# Patient Record
Sex: FEMALE | Race: WHITE | HISPANIC OR LATINO | Employment: FULL TIME | URBAN - METROPOLITAN AREA
[De-identification: names, ages, dates, MRNs, and addresses within clinical notes are randomized per-mention and may not be internally consistent; named-entity substitution may affect disease eponyms.]

---

## 2017-02-10 ENCOUNTER — TRANSCRIBE ORDERS (OUTPATIENT)
Dept: ADMINISTRATIVE | Facility: HOSPITAL | Age: 47
End: 2017-02-10

## 2017-02-10 DIAGNOSIS — Z12.31 SCREENING MAMMOGRAM FOR HIGH-RISK PATIENT: Primary | ICD-10-CM

## 2017-02-10 DIAGNOSIS — Z12.31 ENCOUNTER FOR SCREENING MAMMOGRAM FOR MALIGNANT NEOPLASM OF BREAST: ICD-10-CM

## 2017-02-20 ENCOUNTER — HOSPITAL ENCOUNTER (OUTPATIENT)
Dept: MAMMOGRAPHY | Facility: HOSPITAL | Age: 47
Discharge: HOME | End: 2017-02-20
Payer: COMMERCIAL

## 2017-02-20 ENCOUNTER — GENERIC CONVERSION - ENCOUNTER (OUTPATIENT)
Dept: OTHER | Facility: OTHER | Age: 47
End: 2017-02-20

## 2017-02-20 DIAGNOSIS — Z12.31 SCREENING MAMMOGRAM, ENCOUNTER FOR: ICD-10-CM

## 2017-02-20 PROCEDURE — G0202 SCR MAMMO BI INCL CAD: HCPCS

## 2017-03-20 ENCOUNTER — ALLSCRIPTS OFFICE VISIT (OUTPATIENT)
Dept: OTHER | Facility: OTHER | Age: 47
End: 2017-03-20

## 2017-03-20 DIAGNOSIS — E66.9 OBESITY: ICD-10-CM

## 2017-03-20 DIAGNOSIS — M79.605 PAIN OF LEFT LEG: ICD-10-CM

## 2017-03-20 DIAGNOSIS — E55.9 VITAMIN D DEFICIENCY: ICD-10-CM

## 2017-03-20 DIAGNOSIS — E78.5 HYPERLIPIDEMIA: ICD-10-CM

## 2017-03-20 DIAGNOSIS — I80.02 PHLEBITIS OF SUPERFICIAL VEIN OF LEFT LOWER EXTREMITY: ICD-10-CM

## 2017-04-06 ENCOUNTER — HOSPITAL ENCOUNTER (OUTPATIENT)
Dept: NON INVASIVE DIAGNOSTICS | Facility: CLINIC | Age: 47
Discharge: HOME | End: 2017-04-06
Payer: COMMERCIAL

## 2017-04-06 ENCOUNTER — GENERIC CONVERSION - ENCOUNTER (OUTPATIENT)
Dept: OTHER | Facility: OTHER | Age: 47
End: 2017-04-06

## 2017-04-06 DIAGNOSIS — M79.605 PAIN OF LEFT LEG: ICD-10-CM

## 2017-04-06 DIAGNOSIS — I80.02 PHLEBITIS OF SUPERFICIAL VEIN OF LEFT LOWER EXTREMITY: ICD-10-CM

## 2017-04-06 PROCEDURE — 93971 EXTREMITY STUDY: CPT

## 2017-08-09 ENCOUNTER — ALLSCRIPTS OFFICE VISIT (OUTPATIENT)
Dept: OTHER | Facility: OTHER | Age: 47
End: 2017-08-09

## 2017-08-09 DIAGNOSIS — I83.893 VARICOSE VEINS OF BILATERAL LOWER EXTREMITIES WITH OTHER COMPLICATIONS: ICD-10-CM

## 2017-09-13 ENCOUNTER — HOSPITAL ENCOUNTER (OUTPATIENT)
Dept: NON INVASIVE DIAGNOSTICS | Facility: CLINIC | Age: 47
Discharge: HOME | End: 2017-09-13
Payer: COMMERCIAL

## 2017-09-13 DIAGNOSIS — I83.893 VARICOSE VEINS OF BILATERAL LOWER EXTREMITIES WITH OTHER COMPLICATIONS: ICD-10-CM

## 2017-09-13 PROCEDURE — 93970 EXTREMITY STUDY: CPT

## 2018-01-13 VITALS
OXYGEN SATURATION: 99 % | HEART RATE: 94 BPM | BODY MASS INDEX: 38.14 KG/M2 | WEIGHT: 207.25 LBS | TEMPERATURE: 98.2 F | HEIGHT: 62 IN | SYSTOLIC BLOOD PRESSURE: 130 MMHG | RESPIRATION RATE: 14 BRPM | DIASTOLIC BLOOD PRESSURE: 84 MMHG

## 2018-01-13 VITALS — RESPIRATION RATE: 15 BRPM | HEART RATE: 86 BPM | DIASTOLIC BLOOD PRESSURE: 76 MMHG | SYSTOLIC BLOOD PRESSURE: 136 MMHG

## 2018-01-13 NOTE — RESULT NOTES
Message   mammo screening benign     Verified Results  * MAMMO SCREENING BILATERAL W CAD [id number] 09:38AM Mary Ellen Dean     Test Name Result Flag Reference   MAMMO SCREENING BILATERAL W CAD (Report)     Patient History:   Family history of breast cancer at age 48 in maternal cousin  No Hormone Replacement Therapy   Patient has never smoked  Patient's BMI is 35 1  Reason for exam: screening, asymptomatic  Mammo Screening Bilateral W CAD: February 20, 2017 - Check In #:    [de-identified]   Bilateral MLO, CC, and XCCL view(s) were taken  Technologist: COLEEN Mendez (R)(M)   Prior study comparison: November 4, 2014, bilateral digital    screening mammogram, performed at [place] [address]  September 6, 2013, digital bilateral screening    mammogram, performed at [facility]  August 17, 2011,   left breast digital diagnostic mammogram, performed at [facility]  August 11, 2011, digital bilateral screening    mammogram, performed at [facility]  March 19, 2010,    digital bilateral screening mammogram, performed at [facility]  February 20, 2009, bilateral AMA DIGITAL SCREENING    MAMMOGRAM, performed at [facility]  There are scattered fibroglandular densities  No dominant soft tissue mass, architectural distortion or    suspicious calcifications are noted  The skin and nipple    contours are within normal limits  No evidence of malignancy  No significant changes   when compared with prior studies  ACR BI-RADSï¾® Assessments: BiRad:1 - Negative     Recommendation:   Routine screening mammogram of both breasts in 1 year  A    reminder letter will be sent  Analyzed by CAD     8-10% of cancers will be missed on mammography  Management of a    palpable abnormality must be based on clinical grounds  Patients   will be notified of their results via letter from our facility     Accredited by [employer] and FDA  Transcription Location: COLEEN Quach 98: [id number]     Risk Value(s):   Tyrer-Cuzick 10 Year: 1 800%, Tyrer-Cuzick Lifetime: 8 600%,    Myriad Table: 1 5%, MAX 5 Year: 0 8%, NCI Lifetime: 8 3%   Signed by:    Lynda Greene MD   2/20/17

## 2018-01-14 NOTE — RESULT NOTES
Message   Please notify pt labs are back  Can we change her appt in April to sooner in a month or so  Let her know not to worry but I want to start her on meds and I wanna discuss it with her  tx     Verified Results  (1) CBC/PLT/DIFF [id number] 08:13AM [facility] Order Number: [id number]    TW Order Number: [id number]     Test Name Result Flag Reference   WBC COUNT 7 70 Thousand/uL  4 31-10 16   RBC COUNT 4 62 Million/uL  3 81-5 12   HEMOGLOBIN 14 5 g/dL  11 5-15 4   HEMATOCRIT 42 4 %  34 8-46  1   MCV 92 fL  82-98   MCH 31 4 pg  26 8-34 3   MCHC 34 2 g/dL  31 4-37 4   RDW 12 8 %  11 6-15 1   MPV 10 6 fL  8 9-12 7   PLATELET COUNT 782 Thousands/uL  149-390   nRBC AUTOMATED 0 /100 WBCs     NEUTROPHILS RELATIVE PERCENT 73 %  43-75   LYMPHOCYTES RELATIVE PERCENT 20 %  14-44   MONOCYTES RELATIVE PERCENT 6 %  4-12   EOSINOPHILS RELATIVE PERCENT 0 %  0-6   BASOPHILS RELATIVE PERCENT 1 %  0-1   NEUTROPHILS ABSOLUTE COUNT 5 66 Thousands/µL  1 85-7 62   LYMPHOCYTES ABSOLUTE COUNT 1 56 Thousands/µL  0 60-4 47   MONOCYTES ABSOLUTE COUNT 0 43 Thousand/µL  0 17-1 22   EOSINOPHILS ABSOLUTE COUNT 0 00 Thousand/µL  0 00-0 61   BASOPHILS ABSOLUTE COUNT 0 05 Thousands/µL  0 00-0 10     (1) TSH WITH FT4 REFLEX [id number] 08:13AM [facility] Order Number: [id number]    Patients undergoing fluorescein dye angiography may retain small amounts of fluorescein in the body for 48-72 hours post procedure  Samples containing fluorescein can produce falsely depressed TSH values  If the patient had this procedure,a specimen should be resubmitted post fluorescein clearance          The recommended reference ranges for TSH during pregnancy are as follows:  First trimester 0 1 to 2 5 uIU/mL  Second trimester  0 2 to 3 0 uIU/mL  Third trimester 0 3 to 3 0 uIU/m     Test Name Result Flag Reference   TSH 1 730 uIU/mL  0 358-3 740     (1) COMPREHENSIVE METABOLIC PANEL [id number] 92:67BJ Arturo Kappa   TW Order Number: [id number]      National Kidney Disease Education Program recommendations are as follows:  GFR calculation is accurate only with a steady state creatinine  Chronic Kidney disease less than 60 ml/min/1 73 sq  meters  Kidney failure less than 15 ml/min/1 73 sq  meters  Test Name Result Flag Reference   GLUCOSE,RANDM 87 mg/dL     If the patient is fasting, the ADA then defines impaired fasting glucose as > 100 mg/dL and diabetes as > or equal to 123 mg/dL  SODIUM 137 mmol/L  136-145   POTASSIUM 4 2 mmol/L  3 5-5 3   CHLORIDE 103 mmol/L  100-108   CARBON DIOXIDE 27 mmol/L  21-32   ANION GAP (CALC) 7 mmol/L  4-13   BLOOD UREA NITROGEN 15 mg/dL  5-25   CREATININE 0 82 mg/dL  0 60-1 30   Standardized to IDMS reference method   CALCIUM 9 1 mg/dL  8 3-10 1   BILI, TOTAL 0 52 mg/dL  0 20-1 00   ALK PHOSPHATAS 73 U/L     ALT (SGPT) 39 U/L  12-78   AST(SGOT) 25 U/L  5-45   ALBUMIN 3 6 g/dL  3 5-5 0   TOTAL PROTEIN 7 9 g/dL  6 4-8 2   eGFR Non-African American      >60 0 ml/min/1 73sq m     (1) LIPID PANEL FASTING W DIRECT LDL REFLEX [id number] 08:13ROBERT Barone Order Number: [id number]      Triglyceride:         Normal              <150 mg/dl       Borderline High    150-199 mg/dl       High               200-499 mg/dl       Very High          >499 mg/dl  Cholesterol:         Desirable        <200 mg/dl      Borderline High  200-239 mg/dl      High             >239 mg/dl  HDL Cholesterol:        High    >59 mg/dL      Low     <41 mg/dL  LDL Cholesterol:        Optimal          <100 mg/dl         Near Optimal     100-129 mg/dl        Above Optimal          Borderline High   130-159 mg/dl          High              160-189 mg/dl          Very High        >189 mg/dl  LDL CALCULATED:    This screening LDL is a calculated result  It does not have the accuracy of the Direct Measured LDL in the monitoring of patients with hyperlipidemia and/or statin therapy     Direct Measure LDL (LHU990) must be ordered separately in these patients       Test Name Result Flag Reference   CHOLESTEROL 204 mg/dL H    LDL CHOLESTEROL CALCULATED 120 mg/dL H 0-100   TRIGLYCERIDES 78 mg/dL  <=150   HDL,DIRECT 68 mg/dL H 40-60     (1) MAGNESIUM [id number] 08:13AM [facility] Order Number: [id number]     Test Name Result Flag Reference   MAGNESIUM 2 2 mg/dL  1 6-2 6     (1) CK (CPK) [id number] 08:13AM [facility] Order Number: [id number]     Test Name Result Flag Reference   CK (CPK) 140 U/L     CK MB FRACTION 1 6 ng/mL  0 0-5 0   CK-MB INDEX 1 1 %  0 0-2 5     (1) URINALYSIS w URINE C/S REFLEX (will reflex a microscopy if leukocytes, occult blood, or nitrites are not within normal limits) [id number] 08:13AM [employer] Order Number: [id number]     Test Name Result Flag Reference   COLOR Yellow     CLARITY Clear     PH UA 6 5  4 5-8 0   LEUKOCYTE ESTERASE UA Trace A Negative   NITRITE UA Negative  Negative   PROTEIN UA Negative mg/dl  Negative   GLUCOSE UA Negative mg/dl  Negative   KETONES UA Negative mg/dl  Negative   UROBILINOGEN UA 0 2 E U /dl  0 2, 1 0 E U /dl   BILIRUBIN UA Negative  Negative   BLOOD UA Negative  Negative, Trace-Intact   SPECIFIC GRAVITY UA 1 025  1 003-1 030   BACTERIA None Seen /hpf  None Seen, Occasional   EPITHELIAL CELLS Occasional /hpf  None Seen, Occasional   RBC UA None Seen /hpf  None Seen, 2-4, 0-1, 1-2   WBC UA 4-10 /hpf A None Seen, 2-4, 0-1, 1-2     (1) VITAMIN D 25-HYDROXY [id number] 08:13AM [employer] Order Number: [id number]     Order Number: [id number]     Test Name Result Flag Reference   VIT D 25-HYDROX 16 9 ng/mL L 30 0-100 0     (1) HOMOCYSTEINE [id number] 08:13AM [facility] Order Number: [id number]     Test Name Result Flag Reference   HOMOCYSTEINE 17 5 umol/L H 3 7-11 2     (1) HEMOGLOBIN A1C [id number] 08:13AM [employer] Order Number: [id number]      5 7-6 4% impaired fasting glucose  >=6 5% diagnosis of diabetes    Falsely low levels are seen in conditions linked to short RBC life span-  hemolytic anemia, and splenomegaly  Falsely elevated levels are seen in situations where there is an increased production of RBC- receipt of erythropoietin or blood transfusions  Adopted from ADA-Clinical Practice Recommendations     Test Name Result Flag Reference   HEMOGLOBIN A1C 5 2 %  4 0-5 6   EST  AVG   GLUCOSE 103 mg/dl         Signatures   Electronically signed by : CATARINA Castañeda ; Feb 4 2016  2:15PM EST                       (Author)

## 2018-01-15 NOTE — RESULT NOTES
Verified Results  (1) VITAMIN B12 [id number] 08:20AM [email or web address] Order Number: [id number]     Test Name Result Flag Reference   VITAMIN B12 824 pg/mL  100-900     (1) FOLATE [id number] 08:20AM [email or web address] Order Number: [id number]     Test Name Result Flag Reference   FOLATE >20 0 ng/mL H 3 1-17 5     (1) MTHFR, DNA ANALYSIS [id number] 08:20AM Juan Manuel Pool   Performed at:  [address] [place] [id number]  : Zehra Reyna MD, Phone:  [phone]     Test Name Result Flag Reference   MTHFR Comment     Result: NEGATIVE (No mutation identified)Interpretation:The MTHFR C677T and X9507P variants were not identified  While theindividual does not possess either of these factors, other riskfactors may be detected through systematic clinical laboratoryanalysis  Hyperhomocysteinemia may occur due to mutations in enzymesother than MTHFR that are involved in homocysteine metabolism, orarise due to acquired factors such as folate, vitamin B6 or gwlhjysD73 deficiency  Therefore, this individual's MTHFR result does notguarantee a normal homocysteine level  In the evaluation of vascularand obstetric risk, consider measuring fasting homocysteine  REFERENCE: Comment     Carlos Ao LD, Tidwell Theron  Am J Epidemiol 2000; 151(9):862-877  Bennie Virk MM, Goran Paz Arch Pathol Lab Med 2007; 131(6):872-884  [address] [place] [address]; 10(1):111-113  Hickey SE et al  [facility] Console Med 2013; 15(2):153-156  [place] 86 Gynecol 2011; 118(3):730-740  Jana Edwards et al  Eur J Epidemiol 2013; 80(6):250-176  Dejan Desai, PhDGinger Adam, PhDDestini Mulligan, MS, Pauly Armenta, PhD   [facility] Centerdaniel [facility] counselors are available to discuss these results with healthcare providers at [phone]  Methylenetetrahydrofolate reductase (MTHFR) is a key enzyme in thefolate pathway and is responsible for the metabolism of homocysteine  There are two common variants in the MTHFR gene, c 655C>T(p Rvb264Hpo), referred to as C677T, and c 1286A>C (p Scn428Bbp),referred to as A0174W  Individuals homozygous for C677T (two copiesof the variant), have decreased activity of the MTHFR enzyme and apredisposition to hyperhomocysteinemia, particularly when deficient infolate  Hyperhomocysteinemia is a risk factor for venous thrombosisand coronary artery disease and is associated with an increased riskof fetal open neural tube defects  The C677T variant does notindependently increase risk of these conditions in the absence ofhyperhomocysteinemia  The K5759U variant is not associated withelevated homocysteine levels unless a C677T variant is also present;however, the clinical significance of heterozygosity for both J109Umnq Q7966Q is controversial  Population data suggest that these twovariants are not present on the same chromosome, but rare exceptionshave been reported of triple variant MTHFR genotypes (ie  homozygousfor one variant and heterozygous for the other)  Homozygosity [id number] has an estimated frequency of 10% to 15% in Caucasians and 25%in Hispanics  Additional information:Dietary folic acid, B6 and B24 supplementation has been suggested tolower homocysteine levels in some people  Folic acid supplementationhas been shown to reduce the occurrence of neural tube defects  Methodology:DNA analysis of the MTHFR gene was performed by PCRamplification followed by restriction analysis  Thediagnostic sensitivity is >99% for both  Molecular-basedtesting is highly accurate, but as in any laboratory test,rare diagnostic errors may occur  All test results must becombined with clinical information for the most accurateinterpretation         Plan  Elevated homocysteine    · Vitamin B-12 1000 MCG Oral Tablet; take 1 tablet by mouth daily as directed    Discussion/Summary         Will recommend vit B12 bc homocysteine elevated, also a multivitamin that contains FA and B complex ( vit B6)  FA was high and MTHFR normal    She will also be starting Wellbutrin that she was using before  wellbutrin 150mg bid  Will RTO in 3-4 weeks       Signatures   Electronically signed by : CATARINA Chavis ; Feb 16 2016  9:35AM EST                       (Author)

## 2018-01-17 NOTE — RESULT NOTES
Message   Doppler study is normal, no DVT  Verified Results  VAS LOWER LIMB VENOUS DUPLEX STUDY, UNILATERAL/LIMITED [id number] 08:38AM Monica Izaguirre Order Number: [id number]    - Patient Instructions: To schedule this appointment, please contact Central Scheduling at [phone]  Test Name Result Flag Reference   VAS LOWER LIMB VENOUS DUPLEX STUDY, UNILATERAL/LIMITED (Report)     [facility] REPORT   CLINICAL:   Indications: Patient presents with left lower extremity pain and edema  Operative History:   No cardiovascular surgeries      Clinical:   Left Lower Limb   There is complaint of pain and edema  FINDINGS:      Segment Right      Left          Impression    Impression       CFV   Normal (Patent) Normal (Patent)             CONCLUSION:   Impression:   RIGHT LOWER LIMB LIMITED: NORMAL   Evaluation shows no evidence of thrombus in the common femoral vein  Doppler evaluation shows a normal response to augmentation maneuvers  LEFT LOWER LIMB: NORMAL   No evidence of acute or chronic deep vein thrombosis   No evidence of superficial thrombophlebitis noted  Doppler evaluation shows a normal response to augmentation maneuvers  Popliteal, posterior tibial and anterior tibial arterial Doppler waveforms are   triphasic        SIGNATURE:   Electronically Signed by: Lisa Garcia MD on 2017-04-06 03:03:37 PM

## 2018-02-15 DIAGNOSIS — Z12.31 SCREENING MAMMOGRAM, ENCOUNTER FOR: Primary | ICD-10-CM

## 2018-03-17 ENCOUNTER — APPOINTMENT (OUTPATIENT)
Dept: LAB | Facility: HOSPITAL | Age: 48
End: 2018-03-17
Payer: COMMERCIAL

## 2018-03-17 DIAGNOSIS — E66.9 OBESITY: ICD-10-CM

## 2018-03-17 DIAGNOSIS — M79.605 PAIN OF LEFT LEG: ICD-10-CM

## 2018-03-17 DIAGNOSIS — E55.9 VITAMIN D DEFICIENCY: ICD-10-CM

## 2018-03-17 DIAGNOSIS — E78.5 HYPERLIPIDEMIA: ICD-10-CM

## 2018-03-17 DIAGNOSIS — I80.02 PHLEBITIS OF SUPERFICIAL VEIN OF LEFT LOWER EXTREMITY: ICD-10-CM

## 2018-03-17 LAB
25(OH)D3 SERPL-MCNC: 25 NG/ML (ref 30–100)
ALBUMIN SERPL BCP-MCNC: 3.2 G/DL (ref 3.5–5)
ALP SERPL-CCNC: 70 U/L (ref 46–116)
ALT SERPL W P-5'-P-CCNC: 28 U/L (ref 12–78)
ANION GAP SERPL CALCULATED.3IONS-SCNC: 5 MMOL/L (ref 4–13)
AST SERPL W P-5'-P-CCNC: 19 U/L (ref 5–45)
BILIRUB SERPL-MCNC: 0.35 MG/DL (ref 0.2–1)
BUN SERPL-MCNC: 15 MG/DL (ref 5–25)
CALCIUM SERPL-MCNC: 8.3 MG/DL (ref 8.3–10.1)
CHLORIDE SERPL-SCNC: 110 MMOL/L (ref 100–108)
CHOLEST SERPL-MCNC: 178 MG/DL (ref 50–200)
CO2 SERPL-SCNC: 26 MMOL/L (ref 21–32)
CREAT SERPL-MCNC: 0.72 MG/DL (ref 0.6–1.3)
EST. AVERAGE GLUCOSE BLD GHB EST-MCNC: 111 MG/DL
GFR SERPL CREATININE-BSD FRML MDRD: 99 ML/MIN/1.73SQ M
GLUCOSE P FAST SERPL-MCNC: 91 MG/DL (ref 65–99)
HBA1C MFR BLD: 5.5 % (ref 4.2–6.3)
HDLC SERPL-MCNC: 56 MG/DL (ref 40–60)
LDLC SERPL CALC-MCNC: 94 MG/DL (ref 0–100)
POTASSIUM SERPL-SCNC: 3.9 MMOL/L (ref 3.5–5.3)
PROT SERPL-MCNC: 7.2 G/DL (ref 6.4–8.2)
SODIUM SERPL-SCNC: 141 MMOL/L (ref 136–145)
TRIGL SERPL-MCNC: 138 MG/DL

## 2018-03-17 PROCEDURE — 83036 HEMOGLOBIN GLYCOSYLATED A1C: CPT

## 2018-03-17 PROCEDURE — 80061 LIPID PANEL: CPT

## 2018-03-17 PROCEDURE — 80053 COMPREHEN METABOLIC PANEL: CPT

## 2018-03-17 PROCEDURE — 36415 COLL VENOUS BLD VENIPUNCTURE: CPT

## 2018-03-17 PROCEDURE — 82306 VITAMIN D 25 HYDROXY: CPT

## 2018-03-27 ENCOUNTER — OFFICE VISIT (OUTPATIENT)
Dept: FAMILY MEDICINE CLINIC | Facility: CLINIC | Age: 48
End: 2018-03-27
Payer: COMMERCIAL

## 2018-03-27 VITALS
DIASTOLIC BLOOD PRESSURE: 80 MMHG | BODY MASS INDEX: 37.73 KG/M2 | HEART RATE: 72 BPM | SYSTOLIC BLOOD PRESSURE: 110 MMHG | WEIGHT: 205 LBS | HEIGHT: 62 IN | TEMPERATURE: 97.7 F | RESPIRATION RATE: 20 BRPM | OXYGEN SATURATION: 98 %

## 2018-03-27 DIAGNOSIS — Z12.4 CERVICAL CANCER SCREENING: ICD-10-CM

## 2018-03-27 DIAGNOSIS — Z01.419 GYNECOLOGIC EXAM NORMAL: ICD-10-CM

## 2018-03-27 DIAGNOSIS — N62 LARGE BREASTS: Primary | ICD-10-CM

## 2018-03-27 PROBLEM — I80.02 PHLEBITIS OF LEG, LEFT, SUPERFICIAL: Status: ACTIVE | Noted: 2017-03-20

## 2018-03-27 PROBLEM — M79.605 LEFT LEG PAIN: Status: ACTIVE | Noted: 2017-03-20

## 2018-03-27 PROBLEM — I83.893 SYMPTOMATIC VARICOSE VEINS OF BOTH LOWER EXTREMITIES: Status: ACTIVE | Noted: 2017-08-09

## 2018-03-27 PROCEDURE — G0145 SCR C/V CYTO,THINLAYER,RESCR: HCPCS | Performed by: FAMILY MEDICINE

## 2018-03-27 PROCEDURE — 87624 HPV HI-RISK TYP POOLED RSLT: CPT | Performed by: FAMILY MEDICINE

## 2018-03-27 PROCEDURE — 99396 PREV VISIT EST AGE 40-64: CPT | Performed by: FAMILY MEDICINE

## 2018-03-27 RX ORDER — NAPROXEN SODIUM 220 MG
TABLET ORAL
COMMUNITY
Start: 2017-03-20 | End: 2022-02-17

## 2018-03-27 RX ORDER — CHLORAL HYDRATE 500 MG
CAPSULE ORAL
COMMUNITY
Start: 2013-05-08 | End: 2022-02-17

## 2018-03-27 NOTE — PROGRESS NOTES
Assessment/Plan:    No problem-specific Assessment & Plan notes found for this encounter  Diagnoses and all orders for this visit:    Large breasts  -     Ambulatory referral to Plastic Surgery; Future    Gynecologic exam normal  -     Pap IG, HPV-hr; Future    Other orders  -     Cyanocobalamin (VITAMIN B12) 1000 MCG TBCR; Take 1 tablet by mouth daily  -     Multiple Vitamins-Minerals (MULTI-VITAMIN/MINERALS PO); Take 1 tablet by mouth daily  -     aspirin 81 MG tablet; Take by mouth  -     naproxen sodium (ALEVE) 220 MG tablet; Take by mouth  -     Omega-3 Fatty Acids (FISH OIL) 1,000 mg; Take by mouth        Pap with cotesting  Re-pap in 5 years if normal    anticipatory guidance given appropiate for her age  Subjective:   Pt here for GYN exam  Pt interested in breast reduction wants a referral     Patient ID: Ella Zapata is a 50 y o  female  HPI   Pt presents for GYN /PAP  , all c-sections  Last PAP was 3 years ago and per pt normal    Irregular menses but she has some procedure for heavy bleeding like an ablation, now menses are light  She is interested in breast reduction due to large breasts and back pain  Size 36DDD     Denies any  concerns  Not currently sexually active    The following portions of the patient's history were reviewed and updated as appropriate: allergies, current medications, past family history, past medical history, past social history, past surgical history and problem list     Review of Systems   Constitutional: Negative  HENT: Negative  Respiratory: Negative  Cardiovascular: Negative  Gastrointestinal: Negative  Genitourinary: Negative  Neurological: Negative  Psychiatric/Behavioral: Negative            Objective:      /80   Pulse 72   Temp 97 7 °F (36 5 °C)   Resp 20   Ht 5' 1 5" (1 562 m)   Wt 93 kg (205 lb)   LMP 2018 (Exact Date)   SpO2 98%   BMI 38 11 kg/m²          Physical Exam   Constitutional: She is oriented to person, place, and time  She appears well-developed and well-nourished  HENT:   Head: Normocephalic  Eyes: Conjunctivae are normal    Cardiovascular: Normal rate, regular rhythm and normal heart sounds  Pulmonary/Chest: Effort normal and breath sounds normal  No respiratory distress  She has no wheezes  She has no rales  Abdominal: Soft  Bowel sounds are normal  There is no tenderness  Genitourinary: Vagina normal and uterus normal  No vaginal discharge found  Genitourinary Comments: Normal cervix  PAP obtained   Neurological: She is alert and oriented to person, place, and time  Psychiatric: She has a normal mood and affect  Her behavior is normal    Vitals reviewed

## 2018-03-28 ENCOUNTER — LAB REQUISITION (OUTPATIENT)
Dept: LAB | Facility: HOSPITAL | Age: 48
End: 2018-03-28
Payer: COMMERCIAL

## 2018-03-28 DIAGNOSIS — Z01.419 ENCOUNTER FOR GYNECOLOGICAL EXAMINATION WITHOUT ABNORMAL FINDING: ICD-10-CM

## 2018-03-30 LAB — HPV RRNA GENITAL QL NAA+PROBE: NORMAL

## 2018-04-03 LAB
LAB AP GYN PRIMARY INTERPRETATION: NORMAL
LAB AP LMP: NORMAL
Lab: NORMAL

## 2019-05-31 ENCOUNTER — TELEPHONE (OUTPATIENT)
Dept: FAMILY MEDICINE CLINIC | Facility: CLINIC | Age: 49
End: 2019-05-31

## 2019-05-31 DIAGNOSIS — Z12.39 SCREENING FOR BREAST CANCER: Primary | ICD-10-CM

## 2019-06-17 ENCOUNTER — HOSPITAL ENCOUNTER (OUTPATIENT)
Dept: MAMMOGRAPHY | Facility: HOSPITAL | Age: 49
Discharge: HOME | End: 2019-06-17
Payer: COMMERCIAL

## 2019-06-17 VITALS — BODY MASS INDEX: 33.66 KG/M2 | HEIGHT: 63 IN | WEIGHT: 190 LBS

## 2019-06-17 DIAGNOSIS — Z12.39 SCREENING FOR BREAST CANCER: ICD-10-CM

## 2019-06-17 PROCEDURE — 77067 SCR MAMMO BI INCL CAD: CPT

## 2019-10-01 ENCOUNTER — TELEPHONE (OUTPATIENT)
Dept: FAMILY MEDICINE CLINIC | Facility: CLINIC | Age: 49
End: 2019-10-01

## 2019-11-25 ENCOUNTER — OFFICE VISIT (OUTPATIENT)
Dept: FAMILY MEDICINE CLINIC | Facility: CLINIC | Age: 49
End: 2019-11-25
Payer: COMMERCIAL

## 2019-11-25 VITALS
DIASTOLIC BLOOD PRESSURE: 76 MMHG | TEMPERATURE: 98.2 F | SYSTOLIC BLOOD PRESSURE: 130 MMHG | RESPIRATION RATE: 18 BRPM | HEART RATE: 80 BPM | BODY MASS INDEX: 33.84 KG/M2 | WEIGHT: 191 LBS | OXYGEN SATURATION: 98 % | HEIGHT: 63 IN

## 2019-11-25 DIAGNOSIS — Z13.21 SCREENING FOR ENDOCRINE, NUTRITIONAL, METABOLIC AND IMMUNITY DISORDER: ICD-10-CM

## 2019-11-25 DIAGNOSIS — Z13.0 SCREENING FOR ENDOCRINE, NUTRITIONAL, METABOLIC AND IMMUNITY DISORDER: ICD-10-CM

## 2019-11-25 DIAGNOSIS — R06.83 SNORING: ICD-10-CM

## 2019-11-25 DIAGNOSIS — Z12.11 SCREENING FOR COLORECTAL CANCER: ICD-10-CM

## 2019-11-25 DIAGNOSIS — Z13.29 SCREENING FOR ENDOCRINE, NUTRITIONAL, METABOLIC AND IMMUNITY DISORDER: ICD-10-CM

## 2019-11-25 DIAGNOSIS — Z12.12 SCREENING FOR COLORECTAL CANCER: ICD-10-CM

## 2019-11-25 DIAGNOSIS — Z13.228 SCREENING FOR ENDOCRINE, NUTRITIONAL, METABOLIC AND IMMUNITY DISORDER: ICD-10-CM

## 2019-11-25 DIAGNOSIS — Z00.00 ANNUAL PHYSICAL EXAM: Primary | ICD-10-CM

## 2019-11-25 PROBLEM — IMO0002 CURRENT TEAR OF MEDIAL CARTILAGE OR MENISCUS OF KNEE: Status: ACTIVE | Noted: 2019-11-25

## 2019-11-25 PROBLEM — M17.10 OSTEOARTHRITIS OF KNEE: Status: ACTIVE | Noted: 2019-11-25

## 2019-11-25 PROBLEM — M17.9 OSTEOARTHRITIS OF KNEE: Status: ACTIVE | Noted: 2019-11-25

## 2019-11-25 PROBLEM — M22.40 CHONDROMALACIA PATELLAE: Status: ACTIVE | Noted: 2019-11-25

## 2019-11-25 PROCEDURE — 99396 PREV VISIT EST AGE 40-64: CPT | Performed by: FAMILY MEDICINE

## 2019-11-25 NOTE — PROGRESS NOTES
ADULT ANNUAL [facility]    NAME: Tñoito Castillo  AGE: 44 y o  SEX: female  : 1970     DATE: 2019     Assessment and Plan:     Problem List Items Addressed This Visit        Other    Snoring     Patient is fatigued and admits to snoring at night  Will refer to Sleep Medicine for evaluation of possible sleep apnea  Relevant Orders    Ambulatory referral to Sleep Medicine      Other Visit Diagnoses     Annual physical exam    -  Primary    Screening for endocrine, nutritional, metabolic and immunity disorder        Relevant Orders    Lipid panel    Hemoglobin A1C    Comprehensive metabolic panel    CBC and Platelet    TSH, 3rd generation with Free T4 reflex    Vitamin D 25 hydroxy    Vitamin B12    BLANCA Screen w/ Reflex to Titer/Pattern    RF Screen w/ Reflex to Titer    Screening for colorectal cancer        Relevant Orders    Ambulatory referral to Gastroenterology    BMI 33 0-33 9,adult              Immunizations and preventive care screenings were discussed with patient today  Appropriate education was printed on patient's after visit summary  Counseling:  · Exercise: the importance of regular exercise/physical activity was discussed  Recommend exercise 3-5 times per week for at least 30 minutes  BMI Counseling: Body mass index is 33 83 kg/m²  The BMI is above normal  Nutrition recommendations include encouraging healthy choices of fruits and vegetables  Exercise recommendations include exercising 3-5 times per week  No pharmacotherapy was ordered  Return in 1 month (on 2019) for Recheck/Review Labwork  Chief Complaint:     Chief Complaint   Patient presents with    Follow-up      History of Present Illness:     Adult Annual Physical   Patient here for a comprehensive physical exam  The patient reports no problems      Diet and Physical Activity  · Diet/Nutrition: limited junk food and limited fruits/vegetables  · Exercise: 1-2 times a week on average and 30-60 minutes on average  Depression Screening  PHQ-9 Depression Screening    PHQ-9:    Frequency of the following problems over the past two weeks:       Little interest or pleasure in doing things:  0 - not at all  Feeling down, depressed, or hopeless:  0 - not at all  PHQ-2 Score:  0       General Health  · Sleep: gets 4-6 hours of sleep on average, snores loudly and experiences daytime hypersomnolence  · Hearing: normal - bilateral   · Vision: no vision problems and vision problems: reading glasses  · Dental: regular dental visits and brushes teeth once daily  /GYN Health  · Patient is: perimenopausal  · Last menstrual period: May 2019  · Contraceptive method: not addressed  Review of Systems:     Review of Systems   Respiratory: Negative for shortness of breath  Musculoskeletal: Positive for arthralgias (diffuse), back pain and myalgias  Neurological: Negative for dizziness, syncope and headaches  All other systems reviewed and are negative       Past Medical History:     Past Medical History:   Diagnosis Date    Depression with anxiety     last assessed-2016    Hx of varicose veins     Nabothian cyst     Osteoarthritis     TMJ arthralgia       Past Surgical History:     Past Surgical History:   Procedure Laterality Date     SECTION      ENDOMETRIAL BIOPSY      without cervical dilation    KNEE ARTHROPLASTY      KNEE SURGERY      TUBAL LIGATION      last assessed-10/15/2014      Social History:     Social History     Socioeconomic History    Marital status:      Spouse name: None    Number of children: None    Years of education: None    Highest education level: None   Occupational History     Comment: full-time employment   Social Needs    Financial resource strain: Not hard at all   Johanna-Navin insecurity:     Worry: Never true     Inability: Never true   [place] needs: Medical: No     Non-medical: No   Tobacco Use    Smoking status: Never Smoker    Smokeless tobacco: Never Used   Substance and Sexual Activity    Alcohol use: Yes    Drug use: No    Sexual activity: Yes   Lifestyle    Physical activity:     Days per week: 0 days     Minutes per session: 0 min    Stress:  Only a little   Relationships    Social connections:     Talks on phone: Patient refused     Gets together: Patient refused     Attends Taoism service: Patient refused     Active member of club or organization: Patient refused     Attends meetings of clubs or organizations: Patient refused     Relationship status: Patient refused    Intimate partner violence:     Fear of current or ex partner: Patient refused     Emotionally abused: Patient refused     Physically abused: Patient refused     Forced sexual activity: Patient refused   Other Topics Concern    None   Social History Narrative    Coffee    Exercise frequency (times/week)      Family History:     Family History   Problem Relation Age of Onset    Hypertension Mother     Osteoporosis Mother     Heart attack Father     Hypertension Sister     Diabetes type II Sister     Thyroid disease Sister     Hypertension Brother     No Known Problems Daughter     No Known Problems Maternal Grandmother     No Known Problems Maternal Grandfather     No Known Problems Paternal Grandmother     No Known Problems Paternal Grandfather     No Known Problems Sister     No Known Problems Maternal Aunt     Breast cancer Cousin       Current Medications:     Current Outpatient Medications   Medication Sig Dispense Refill    Cyanocobalamin (VITAMIN B12) 1000 MCG TBCR Take 1 tablet by mouth daily      Multiple Vitamins-Minerals (MULTI-VITAMIN/MINERALS PO) Take 1 tablet by mouth daily      Omega-3 Fatty Acids (FISH OIL) 1,000 mg Take by mouth      aspirin 81 MG tablet Take by mouth      naproxen sodium (ALEVE) 220 MG tablet Take by mouth       No current facility-administered medications for this visit  Allergies:     No Known Allergies   Physical Exam:     /76 (BP Location: Left arm, Patient Position: Sitting, Cuff Size: Standard)   Pulse 80   Temp 98 2 °F (36 8 °C) (Tympanic)   Resp 18   Ht 5' 3" (1 6 m)   Wt 86 6 kg (191 lb)   LMP 05/21/2019   SpO2 98%   BMI 33 83 kg/m²     Physical Exam   Constitutional: She is oriented to person, place, and time  She appears well-developed and well-nourished  She is cooperative  She does not appear ill  No distress  HENT:   Head: Normocephalic and atraumatic  Right Ear: Hearing, tympanic membrane, external ear and ear canal normal    Left Ear: Hearing, tympanic membrane, external ear and ear canal normal    Mouth/Throat: Uvula is midline, oropharynx is clear and moist and mucous membranes are normal    Eyes: Pupils are equal, round, and reactive to light  Conjunctivae and lids are normal    Neck: Trachea normal and normal range of motion  Neck supple  No thyromegaly present  Cardiovascular: Normal rate, regular rhythm and normal heart sounds  No murmur heard  Pulmonary/Chest: Effort normal and breath sounds normal  She has no decreased breath sounds  She has no wheezes  She has no rhonchi  She has no rales  Abdominal: Soft  Normal appearance and bowel sounds are normal  There is no hepatosplenomegaly  There is no tenderness  Musculoskeletal: Normal range of motion  Right ankle: She exhibits no swelling  Left ankle: She exhibits no swelling  Lymphadenopathy:        Head (right side): No submandibular adenopathy present  Head (left side): No submandibular adenopathy present  She has no cervical adenopathy  Neurological: She is alert and oriented to person, place, and time  No cranial nerve deficit  She exhibits normal muscle tone  Gait normal    Skin: Skin is warm, dry and intact  Psychiatric: She has a normal mood and affect   Her speech is normal and behavior is normal    Nursing note and vitals reviewed        Smith Todd MD  [address] [place] [address]

## 2019-11-25 NOTE — ASSESSMENT & PLAN NOTE
Patient is fatigued and admits to snoring at night  Will refer to Sleep Medicine for evaluation of possible sleep apnea

## 2019-11-25 NOTE — PATIENT INSTRUCTIONS
Wellness Visit for Adults   AMBULATORY CARE:   A wellness visit  is when you see your healthcare provider to get screened for health problems  You can also get advice on how to stay healthy  Write down your questions so you remember to ask them  Ask your healthcare provider how often you should have a wellness visit  What happens at a wellness visit:  Your healthcare provider will ask about your health, and your family history of health problems  This includes high blood pressure, heart disease, and cancer  He or she will ask if you have symptoms that concern you, if you smoke, and about your mood  You may also be asked about your intake of medicines, supplements, food, and alcohol  Any of the following may be done:  · Your weight  will be checked  Your height may also be checked so your body mass index (BMI) can be calculated  Your BMI shows if you are at a healthy weight  · Your blood pressure  and heart rate will be checked  Your temperature may also be checked  · Blood and urine tests  may be done  Blood tests may be done to check your cholesterol levels  Abnormal cholesterol levels increase your risk for heart disease and stroke  You may also need a blood or urine test to check for diabetes if you are at increased risk  Urine tests may be done to look for signs of an infection or kidney disease  · A physical exam  includes checking your heartbeat and lungs with a stethoscope  Your healthcare provider may also check your skin to look for sun damage  · Screening tests  may be recommended  A screening test is done to check for diseases that may not cause symptoms  The screening tests you may need depend on your age, gender, family history, and lifestyle habits  For example, colorectal screening may be recommended if you are 48years old or older  Screening tests you need if you are a woman:   · A Pap smear  is used to screen for cervical cancer   Pap smears are usually done every 3 to 5 years depending on your age  You may need them more often if you have had abnormal Pap smear test results in the past  Ask your healthcare provider how often you should have a Pap smear  · A mammogram  is an x-ray of your breasts to screen for breast cancer  Experts recommend mammograms every 2 years starting at age 48 years  You may need a mammogram at age 52 years or younger if you have an increased risk for breast cancer  Talk to your healthcare provider about when you should start having mammograms and how often you need them  Vaccines you may need:   · Get an influenza vaccine  every year  The influenza vaccine protects you from the flu  Several types of viruses cause the flu  The viruses change over time, so new vaccines are made each year  · Get a tetanus-diphtheria (Td) booster vaccine  every 10 years  This vaccine protects you against tetanus and diphtheria  Tetanus is a severe infection that may cause painful muscle spasms and lockjaw  Diphtheria is a severe bacterial infection that causes a thick covering in the back of your mouth and throat  · Get a human papillomavirus (HPV) vaccine  if you are female and aged 23 to 32 or male 23 to 24 and never received it  This vaccine protects you from HPV infection  HPV is the most common infection spread by sexual contact  HPV may also cause vaginal, penile, and anal cancers  · Get a pneumococcal vaccine  if you are aged 72 years or older  The pneumococcal vaccine is an injection given to protect you from pneumococcal disease  Pneumococcal disease is an infection caused by pneumococcal bacteria  The infection may cause pneumonia, meningitis, or an ear infection  · Get a shingles vaccine  if you are aged 61 or older, even if you have had shingles before  The shingles vaccine is an injection to protect you from the varicella-zoster virus  This is the same virus that causes chickenpox   Shingles is a painful rash that develops in people who had chickenpox or have been exposed to the virus  How to eat healthy:  My Plate is a model for planning healthy meals  It shows the types and amounts of foods that should go on your plate  Fruits and vegetables make up about half of your plate, and grains and protein make up the other half  A serving of dairy is included on the side of your plate  The amount of calories and serving sizes you need depends on your age, gender, weight, and height  Examples of healthy foods are listed below:  · Eat a variety of vegetables  such as dark green, red, and orange vegetables  You can also include canned vegetables low in sodium (salt) and frozen vegetables without added butter or sauces  · Eat a variety of fresh fruits , canned fruit in 100% juice, frozen fruit, and dried fruit  · Include whole grains  At least half of the grains you eat should be whole grains  Examples include whole-wheat bread, wheat pasta, brown rice, and whole-grain cereals such as oatmeal     · Eat a variety of protein foods such as seafood (fish and shellfish), lean meat, and poultry without skin (turkey and chicken)  Examples of lean meats include pork leg, shoulder, or tenderloin, and beef round, sirloin, tenderloin, and extra lean ground beef  Other protein foods include eggs and egg substitutes, beans, peas, soy products, nuts, and seeds  · Choose low-fat dairy products such as skim or 1% milk or low-fat yogurt, cheese, and cottage cheese  · Limit unhealthy fats  such as butter, hard margarine, and shortening  Exercise:  Exercise at least 30 minutes per day on most days of the week  Some examples of exercise include walking, biking, dancing, and swimming  You can also fit in more physical activity by taking the stairs instead of the elevator or parking farther away from stores  Include muscle strengthening activities 2 days each week  Regular exercise provides many health benefits   It helps you manage your weight, and decreases your risk for type 2 diabetes, heart disease, stroke, and high blood pressure  Exercise can also help improve your mood  Ask your healthcare provider about the best exercise plan for you  General health and safety guidelines:   · Do not smoke  Nicotine and other chemicals in cigarettes and cigars can cause lung damage  Ask your healthcare provider for information if you currently smoke and need help to quit  E-cigarettes or smokeless tobacco still contain nicotine  Talk to your healthcare provider before you use these products  · Limit alcohol  A drink of alcohol is 12 ounces of beer, 5 ounces of wine, or 1½ ounces of liquor  · Lose weight, if needed  Being overweight increases your risk of certain health conditions  These include heart disease, high blood pressure, type 2 diabetes, and certain types of cancer  · Protect your skin  Do not sunbathe or use tanning beds  Use sunscreen with a SPF 15 or higher  Apply sunscreen at least 15 minutes before you go outside  Reapply sunscreen every 2 hours  Wear protective clothing, hats, and sunglasses when you are outside  · Drive safely  Always wear your seatbelt  Make sure everyone in your car wears a seatbelt  A seatbelt can save your life if you are in an accident  Do not use your cell phone when you are driving  This could distract you and cause an accident  Pull over if you need to make a call or send a text message  · Practice safe sex  Use latex condoms if are sexually active and have more than one partner  Your healthcare provider may recommend screening tests for sexually transmitted infections (STIs)  · Wear helmets, lifejackets, and protective gear  Always wear a helmet when you ride a bike or motorcycle, go skiing, or play sports that could cause a head injury  Wear protective equipment when you play sports  Wear a lifejacket when you are on a boat or doing water sports    © 2017 [employer] Information is for End User's use only and may not be sold, redistributed or otherwise used for commercial purposes  All illustrations and images included in CareNotes® are the copyrighted property of [employer]  or Chance Martinez  The above information is an  only  It is not intended as medical advice for individual conditions or treatments  Talk to your doctor, nurse or pharmacist before following any medical regimen to see if it is safe and effective for you  Obesity   AMBULATORY CARE:   Obesity  is when your body mass index (BMI) is greater than 30  Your healthcare provider will use your height and weight to measure your BMI  The risks of obesity include  many health problems, such as injuries or physical disability  You may need tests to check for the following:  · Diabetes     · High blood pressure or high cholesterol     · Heart disease     · Gallbladder or liver disease     · Cancer of the colon, breast, prostate, liver, or kidney     · Sleep apnea     · Arthritis or gout  Seek care immediately if:   · You have a severe headache, confusion, or difficulty speaking  · You have weakness on one side of your body  · You have chest pain, sweating, or shortness of breath  Contact your healthcare provider if:   · You have symptoms of gallbladder or liver disease, such as pain in your upper abdomen  · You have knee or hip pain and discomfort while walking  · You have symptoms of diabetes, such as intense hunger and thirst, and frequent urination  · You have symptoms of sleep apnea, such as snoring or daytime sleepiness  · You have questions or concerns about your condition or care  Treatment for obesity  focuses on helping you lose weight to improve your health  Even a small decrease in BMI can reduce the risk for many health problems  Your healthcare provider will help you set a weight-loss goal   · Lifestyle changes  are the first step in treating obesity   These include making healthy food choices and getting regular physical activity  Your healthcare provider may suggest a weight-loss program that involves coaching, education, and therapy  · Medicine  may help you lose weight when it is used with a healthy diet and physical activity  · Surgery  can help you lose weight if you are very obese and have other health problems  There are several types of weight-loss surgery  Ask your healthcare provider for more information  Be successful losing weight:   · Set small, realistic goals  An example of a small goal is to walk for 20 minutes 5 days a week  Anther goal is to lose 5% of your body weight  · Tell friends, family members, and coworkers about your goals  and ask for their support  Ask a friend to lose weight with you, or join a weight-loss support group  · Identify foods or triggers that may cause you to overeat , and find ways to avoid them  Remove tempting high-calorie foods from your home and workplace  Place a bowl of fresh fruit on your kitchen counter  If stress causes you to eat, then find other ways to cope with stress  · Keep a diary to track what you eat and drink  Also write down how many minutes of physical activity you do each day  Weigh yourself once a week and record it in your diary  Eating changes: You will need to eat 500 to 1,000 fewer calories each day than you currently eat to lose 1 to 2 pounds a week  The following changes will help you cut calories:  · Eat smaller portions  Use small plates, no larger than 9 inches in diameter  Fill your plate half full of fruits and vegetables  Measure your food using measuring cups until you know what a serving size looks like  · Eat 3 meals and 1 or 2 snacks each day  Plan your meals in advance  [employer] Meals and eat at home most of the time  Eat slowly  · Eat fruits and vegetables at every meal   They are low in calories and high in fiber, which makes you feel full   Do not add butter, margarine, or cream sauce to vegetables  Use herbs to season steamed vegetables  · Eat less fat and fewer fried foods  Eat more baked or grilled chicken and fish  These protein sources are lower in calories and fat than red meat  Limit fast food  Dress your salads with olive oil and vinegar instead of bottled dressing  · Limit the amount of sugar you eat  Do not drink sugary beverages  Limit alcohol  Activity changes:  Physical activity is good for your body in many ways  It helps you burn calories and build strong muscles  It decreases stress and depression, and improves your mood  It can also help you sleep better  Talk to your healthcare provider before you begin an exercise program   · Exercise for at least 30 minutes 5 days a week  Start slowly  Set aside time each day for physical activity that you enjoy and that is convenient for you  It is best to do both weight training and an activity that increases your heart rate, such as walking, bicycling, or swimming  · Find ways to be more active  Do yard work and housecleaning  Walk up the stairs instead of using elevators  Spend your leisure time going to events that require walking, such as outdoor festivals or fairs  This extra physical activity can help you lose weight and keep it off  Follow up with your healthcare provider as directed: You may need to meet with a dietitian  Write down your questions so you remember to ask them during your visits  © 2017 [employer] Information is for End User's use only and may not be sold, redistributed or otherwise used for commercial purposes  All illustrations and images included in CareNotes® are the copyrighted property of [employer]  or Chance Martinez  The above information is an  only  It is not intended as medical advice for individual conditions or treatments   Talk to your doctor, nurse or pharmacist before following any medical regimen to see if it is safe and effective for you

## 2019-11-27 ENCOUNTER — APPOINTMENT (OUTPATIENT)
Dept: LAB | Facility: HOSPITAL | Age: 49
End: 2019-11-27
Payer: COMMERCIAL

## 2019-11-27 DIAGNOSIS — Z13.21 SCREENING FOR ENDOCRINE, NUTRITIONAL, METABOLIC AND IMMUNITY DISORDER: ICD-10-CM

## 2019-11-27 DIAGNOSIS — Z13.228 SCREENING FOR ENDOCRINE, NUTRITIONAL, METABOLIC AND IMMUNITY DISORDER: ICD-10-CM

## 2019-11-27 DIAGNOSIS — Z13.0 SCREENING FOR ENDOCRINE, NUTRITIONAL, METABOLIC AND IMMUNITY DISORDER: ICD-10-CM

## 2019-11-27 DIAGNOSIS — Z13.29 SCREENING FOR ENDOCRINE, NUTRITIONAL, METABOLIC AND IMMUNITY DISORDER: ICD-10-CM

## 2019-11-27 LAB
25(OH)D3 SERPL-MCNC: 26.2 NG/ML (ref 30–100)
ALBUMIN SERPL BCP-MCNC: 3.5 G/DL (ref 3.5–5)
ALP SERPL-CCNC: 81 U/L (ref 46–116)
ALT SERPL W P-5'-P-CCNC: 48 U/L (ref 12–78)
ANION GAP SERPL CALCULATED.3IONS-SCNC: 5 MMOL/L (ref 4–13)
AST SERPL W P-5'-P-CCNC: 23 U/L (ref 5–45)
BILIRUB SERPL-MCNC: 0.48 MG/DL (ref 0.2–1)
BUN SERPL-MCNC: 14 MG/DL (ref 5–25)
CALCIUM SERPL-MCNC: 9.2 MG/DL (ref 8.3–10.1)
CHLORIDE SERPL-SCNC: 107 MMOL/L (ref 100–108)
CHOLEST SERPL-MCNC: 229 MG/DL (ref 50–200)
CO2 SERPL-SCNC: 31 MMOL/L (ref 21–32)
CREAT SERPL-MCNC: 0.76 MG/DL (ref 0.6–1.3)
ERYTHROCYTE [DISTWIDTH] IN BLOOD BY AUTOMATED COUNT: 12.4 % (ref 11.6–15.1)
EST. AVERAGE GLUCOSE BLD GHB EST-MCNC: 114 MG/DL
GFR SERPL CREATININE-BSD FRML MDRD: 91 ML/MIN/1.73SQ M
GLUCOSE P FAST SERPL-MCNC: 87 MG/DL (ref 65–99)
HBA1C MFR BLD: 5.6 % (ref 4.2–6.3)
HCT VFR BLD AUTO: 48.9 % (ref 34.8–46.1)
HDLC SERPL-MCNC: 66 MG/DL
HGB BLD-MCNC: 15.9 G/DL (ref 11.5–15.4)
LDLC SERPL CALC-MCNC: 133 MG/DL (ref 0–100)
MCH RBC QN AUTO: 30.9 PG (ref 26.8–34.3)
MCHC RBC AUTO-ENTMCNC: 32.5 G/DL (ref 31.4–37.4)
MCV RBC AUTO: 95 FL (ref 82–98)
NONHDLC SERPL-MCNC: 163 MG/DL
PLATELET # BLD AUTO: 190 THOUSANDS/UL (ref 149–390)
PMV BLD AUTO: 10.3 FL (ref 8.9–12.7)
POTASSIUM SERPL-SCNC: 4.1 MMOL/L (ref 3.5–5.3)
PROT SERPL-MCNC: 7.8 G/DL (ref 6.4–8.2)
RBC # BLD AUTO: 5.15 MILLION/UL (ref 3.81–5.12)
SODIUM SERPL-SCNC: 143 MMOL/L (ref 136–145)
TRIGL SERPL-MCNC: 149 MG/DL
TSH SERPL DL<=0.05 MIU/L-ACNC: 2.12 UIU/ML (ref 0.36–3.74)
VIT B12 SERPL-MCNC: 2786 PG/ML (ref 100–900)
WBC # BLD AUTO: 5.84 THOUSAND/UL (ref 4.31–10.16)

## 2019-11-27 PROCEDURE — 84443 ASSAY THYROID STIM HORMONE: CPT

## 2019-11-27 PROCEDURE — 80061 LIPID PANEL: CPT

## 2019-11-27 PROCEDURE — 80053 COMPREHEN METABOLIC PANEL: CPT

## 2019-11-27 PROCEDURE — 82607 VITAMIN B-12: CPT

## 2019-11-27 PROCEDURE — 83036 HEMOGLOBIN GLYCOSYLATED A1C: CPT

## 2019-11-27 PROCEDURE — 86430 RHEUMATOID FACTOR TEST QUAL: CPT

## 2019-11-27 PROCEDURE — 82306 VITAMIN D 25 HYDROXY: CPT

## 2019-11-27 PROCEDURE — 86038 ANTINUCLEAR ANTIBODIES: CPT

## 2019-11-27 PROCEDURE — 36415 COLL VENOUS BLD VENIPUNCTURE: CPT

## 2019-11-27 PROCEDURE — 85027 COMPLETE CBC AUTOMATED: CPT

## 2019-11-29 LAB
RHEUMATOID FACT SER QL LA: NEGATIVE
RYE IGE QN: NEGATIVE

## 2020-01-08 ENCOUNTER — OFFICE VISIT (OUTPATIENT)
Dept: FAMILY MEDICINE CLINIC | Facility: CLINIC | Age: 50
End: 2020-01-08
Payer: COMMERCIAL

## 2020-01-08 VITALS
RESPIRATION RATE: 17 BRPM | HEART RATE: 82 BPM | SYSTOLIC BLOOD PRESSURE: 118 MMHG | WEIGHT: 205 LBS | BODY MASS INDEX: 36.32 KG/M2 | OXYGEN SATURATION: 97 % | DIASTOLIC BLOOD PRESSURE: 76 MMHG | TEMPERATURE: 98.1 F | HEIGHT: 63 IN

## 2020-01-08 DIAGNOSIS — R79.89 ELEVATED HOMOCYSTEINE: ICD-10-CM

## 2020-01-08 DIAGNOSIS — D75.1 ERYTHROCYTOSIS: ICD-10-CM

## 2020-01-08 DIAGNOSIS — R79.89 HIGH SERUM VITAMIN B12: ICD-10-CM

## 2020-01-08 DIAGNOSIS — E78.5 DYSLIPIDEMIA: ICD-10-CM

## 2020-01-08 DIAGNOSIS — R60.9 EDEMA, UNSPECIFIED TYPE: ICD-10-CM

## 2020-01-08 DIAGNOSIS — E55.9 VITAMIN D DEFICIENCY: Primary | ICD-10-CM

## 2020-01-08 DIAGNOSIS — I83.893 SYMPTOMATIC VARICOSE VEINS OF BOTH LOWER EXTREMITIES: ICD-10-CM

## 2020-01-08 DIAGNOSIS — Z11.4 SCREENING FOR HIV (HUMAN IMMUNODEFICIENCY VIRUS): ICD-10-CM

## 2020-01-08 PROCEDURE — 3008F BODY MASS INDEX DOCD: CPT | Performed by: FAMILY MEDICINE

## 2020-01-08 PROCEDURE — 99214 OFFICE O/P EST MOD 30 MIN: CPT | Performed by: FAMILY MEDICINE

## 2020-01-08 PROCEDURE — 1036F TOBACCO NON-USER: CPT | Performed by: FAMILY MEDICINE

## 2020-01-08 NOTE — ASSESSMENT & PLAN NOTE
Patient's LDL is elevated at 133  She does note that she has eaten more fried foods lately  Discussed diet modification to improve her LDL level  Plan to reassess with lab and follow-up in 6 months

## 2020-01-08 NOTE — ASSESSMENT & PLAN NOTE
Patient has a low vitamin D level at 26 2  Directed patient to take OTC vitamin D 5,000 to 6,000 IU daily  Will plan to reassess in 6 months

## 2020-01-08 NOTE — ASSESSMENT & PLAN NOTE
Encouraged patient to continue to wear her compression stockings as well as follow a low sodium diet  Recommend considering elevating her legs while she sleeps to help with the edema

## 2020-01-08 NOTE — PROGRESS NOTES
Assessment/Plan:    Symptomatic varicose veins of both lower extremities  Encouraged patient to continue to wear her compression stockings as well as follow a low sodium diet  Recommend considering elevating her legs while she sleeps to help with the edema  Vitamin D deficiency  Patient has a low vitamin D level at 26 2  Directed patient to take OTC vitamin D 5,000 to 6,000 IU daily  Will plan to reassess in 6 months  High serum vitamin B12  Patient has a markedly elevated B12 level at 2,786  Directed patient to discontinue her OTC vitamin B12 supplement  Will reassess vitamin B12 level in 6 months  Erythrocytosis  Patient has a slightly elevated RBC, slightly elevated hemoglobin, and slightly elevated hematocrit  Likely due to markedly elevated B12 level  Will reassess CBC in 6 months  Elevated homocysteine ([address] )  Patient noted to have an elevated homocysteine level in 2016  Will reassess homocysteine level with next labs, and determine further management at that time  Edema  Encouraged patient to continue to wear her compression stockings, follow a low sodium diet, and elevate her legs at night  Will continue to monitor  Dyslipidemia  Patient's LDL is elevated at 133  She does note that she has eaten more fried foods lately  Discussed diet modification to improve her LDL level  Plan to reassess with lab and follow-up in 6 months  Diagnoses and all orders for this visit:    Vitamin D deficiency  -     Vitamin D 25 hydroxy; Future    Elevated homocysteine (HCC)  -     Homocysteine, serum; Future    Dyslipidemia  -     Lipid Panel with Direct LDL reflex; Future    High serum vitamin B12  -     Vitamin B12; Future    Erythrocytosis  -     CBC and Platelet; Future    Edema, unspecified type  -     UA w Reflex to Microscopic w Reflex to Culture -Lab Collect; Future    Screening for HIV (human immunodeficiency virus)  -     HIV 1/2 AG-AB combo;  Future    Symptomatic varicose veins of both lower extremities          Subjective:      Patient ID: Toñito Castillo is a 44 y o  female  Patient presents to clinic today for a follow-up visit  She completed her labs, and is here today to review results  She also notes that she has been having some long term issues with leg swelling  She was evaluated a few years ago, without significant findings  She does have a history of varicose veins, and wears compression stockings when she works at night  She noted some increased leg swelling a few weeks ago after returning from a trip to [facility]   She did not wear her compression stockings on the trip  The following portions of the patient's history were reviewed and updated as appropriate: allergies, current medications, past family history, past medical history, past social history, past surgical history and problem list     Review of Systems   Constitutional: Positive for fatigue  Cardiovascular: Positive for leg swelling (chronic, intermittent)  Musculoskeletal: Positive for myalgias  Objective:      /76 (BP Location: Left arm, Patient Position: Sitting, Cuff Size: Standard)   Pulse 82   Temp 98 1 °F (36 7 °C) (Oral)   Resp 17   Ht 5' 3" (1 6 m)   Wt 93 kg (205 lb)   SpO2 97%   BMI 36 31 kg/m²          Physical Exam   Constitutional: She is oriented to person, place, and time  She appears well-developed and well-nourished  She is cooperative  HENT:   Head: Normocephalic and atraumatic  Right Ear: Hearing and external ear normal    Left Ear: Hearing and external ear normal    Eyes: Conjunctivae and lids are normal    Cardiovascular: Normal rate  Pulmonary/Chest: Effort normal    Musculoskeletal: Normal range of motion  Right ankle: She exhibits swelling (trace pitting)  Left ankle: She exhibits swelling (trace pitting)  Left lower leg: She exhibits no tenderness and no swelling  Neurological: She is alert and oriented to person, place, and time  She exhibits normal muscle tone  Gait normal    Skin: Skin is warm, dry and intact  Psychiatric: She has a normal mood and affect  Her speech is normal and behavior is normal    Nursing note and vitals reviewed  BMI Counseling: Body mass index is 36 31 kg/m²  The BMI is above normal  Nutrition recommendations include encouraging healthy choices of fruits and vegetables, limiting drinks that contain sugar and moderation in carbohydrate intake  Exercise recommendations include exercising 3-5 times per week  No pharmacotherapy was ordered            Lab Results   Component Value Date    RF Negative 11/27/2019     Lab Results   Component Value Date    BLANCA Negative 11/27/2019     Lab Results   Component Value Date    KRGSRDXH74 2,786 (H) 11/27/2019     Lab Results   Component Value Date    WBC 5 84 11/27/2019    HGB 15 9 (H) 11/27/2019    HCT 48 9 (H) 11/27/2019    MCV 95 11/27/2019     11/27/2019     Lab Results   Component Value Date    ZOS0SHOXTTRU 2 121 11/27/2019     Lab Results   Component Value Date     10/31/2013    SODIUM 143 11/27/2019    K 4 1 11/27/2019     11/27/2019    CO2 31 11/27/2019    ANIONGAP 7 10/31/2013    AGAP 5 11/27/2019    BUN 14 11/27/2019    CREATININE 0 76 11/27/2019    GLUC 87 02/03/2016    GLUF 87 11/27/2019    CALCIUM 9 2 11/27/2019    AST 23 11/27/2019    ALT 48 11/27/2019    ALKPHOS 81 11/27/2019    PROT 7 9 10/31/2013    TP 7 8 11/27/2019    BILITOT 0 46 10/31/2013    TBILI 0 48 11/27/2019    EGFR 91 11/27/2019     Lab Results   Component Value Date    HGBA1C 5 6 11/27/2019     Lab Results   Component Value Date    CHOLESTEROL 229 (H) 11/27/2019    CHOLESTEROL 178 03/17/2018    CHOLESTEROL 204 (H) 02/03/2016     Lab Results   Component Value Date    HDL 66 11/27/2019    HDL 56 03/17/2018    HDL 68 (H) 02/03/2016     Lab Results   Component Value Date    TRIG 149 11/27/2019    TRIG 138 03/17/2018    TRIG 78 02/03/2016     Lab Results   Component Value Date Eulogio 163 11/27/2019     Lab Results   Component Value Date    LDLCALC 133 (H) 11/27/2019

## 2020-01-08 NOTE — ASSESSMENT & PLAN NOTE
Encouraged patient to continue to wear her compression stockings, follow a low sodium diet, and elevate her legs at night  Will continue to monitor

## 2020-01-08 NOTE — ASSESSMENT & PLAN NOTE
Patient has a slightly elevated RBC, slightly elevated hemoglobin, and slightly elevated hematocrit  Likely due to markedly elevated B12 level  Will reassess CBC in 6 months

## 2020-01-08 NOTE — ASSESSMENT & PLAN NOTE
Patient has a markedly elevated B12 level at 2,786  Directed patient to discontinue her OTC vitamin B12 supplement  Will reassess vitamin B12 level in 6 months

## 2020-01-08 NOTE — ASSESSMENT & PLAN NOTE
Patient noted to have an elevated homocysteine level in 2016  Will reassess homocysteine level with next labs, and determine further management at that time

## 2020-02-03 ENCOUNTER — TELEPHONE (OUTPATIENT)
Dept: GASTROENTEROLOGY | Facility: MEDICAL CENTER | Age: 50
End: 2020-02-03

## 2020-02-03 NOTE — TELEPHONE ENCOUNTER
Left Vm for patient to advise that her appointment has been moved to 02/20/2020 at 10:20am as Dr Darrin Hays will not be in the office on 02/10/2020

## 2020-02-20 ENCOUNTER — OFFICE VISIT (OUTPATIENT)
Dept: GASTROENTEROLOGY | Facility: MEDICAL CENTER | Age: 50
End: 2020-02-20
Payer: COMMERCIAL

## 2020-02-20 VITALS
DIASTOLIC BLOOD PRESSURE: 80 MMHG | WEIGHT: 206 LBS | TEMPERATURE: 99 F | BODY MASS INDEX: 36.5 KG/M2 | HEART RATE: 62 BPM | HEIGHT: 63 IN | SYSTOLIC BLOOD PRESSURE: 122 MMHG

## 2020-02-20 DIAGNOSIS — Z12.11 SCREENING FOR COLORECTAL CANCER: ICD-10-CM

## 2020-02-20 DIAGNOSIS — Z12.12 SCREENING FOR COLORECTAL CANCER: ICD-10-CM

## 2020-02-20 PROCEDURE — 99243 OFF/OP CNSLTJ NEW/EST LOW 30: CPT | Performed by: INTERNAL MEDICINE

## 2020-02-20 NOTE — PROGRESS NOTES
[facility] - Outpatient Consultation  Elisa Wilkerson 44 y o  female MRN: [id number]  Encounter: [id number]          ASSESSMENT AND PLAN:      1  Screening for colorectal cancer  Patient is due for colon cancer screening  Schedule colonoscopy  Patient was counseled on the benefits and risks of endoscopic intervention including but not limited to bleeding, infection, bowel perforation  Patient also understands colonoscopy is not 100% sensitive to detect polyps  - Na Sulfate-K Sulfate-Mg Sulf (Suprep Bowel Prep Kit) 17 5-3 13-1 6 GM/177ML SOLN; Take 1 Bottle by mouth once for 1 dose  Dispense: 1 Bottle; Refill: 0  - Colonoscopy; Future    ______________________________________________________________________    HPI:      57-year-old female referred for evaluation of colon cancer screening  Patient never had a colonoscopy before  She reported regular formed bowel movement  She reported 1 time she noticed blood in the stool when she was straining very hard stool  But overall she has regular bowel movement  She denies family history of colon cancer  She is nonsmoker, denies alcohol abuse  REVIEW OF SYSTEMS:    CONSTITUTIONAL: Denies any fever, chills, rigors, and weight loss  HEENT: No earache or tinnitus  Denies hearing loss or visual disturbances  CARDIOVASCULAR: No chest pain or palpitations  RESPIRATORY: Denies any cough, hemoptysis, shortness of breath or dyspnea on exertion  GASTROINTESTINAL: As noted in the History of Present Illness  GENITOURINARY: No problems with urination  Denies any hematuria or dysuria  NEUROLOGIC: No dizziness or vertigo, denies headaches  MUSCULOSKELETAL: Denies any muscle or joint pain  SKIN: Denies skin rashes or itching  ENDOCRINE: Denies excessive thirst  Denies intolerance to heat or cold  PSYCHOSOCIAL: Denies depression or anxiety  Denies any recent memory loss         Historical Information   Past Medical History:   Diagnosis Date  Depression with anxiety     last assessed-2016    Hx of varicose veins     Nabothian cyst     Osteoarthritis     TMJ arthralgia      Past Surgical History:   Procedure Laterality Date     SECTION      ENDOMETRIAL BIOPSY      without cervical dilation    KNEE ARTHROPLASTY      KNEE SURGERY      TUBAL LIGATION      last assessed-10/15/2014     Social History   Social History     Substance and Sexual Activity   Alcohol Use Yes     Social History     Substance and Sexual Activity   Drug Use No     Social History     Tobacco Use   Smoking Status Never Smoker   Smokeless Tobacco Never Used     Family History   Problem Relation Age of Onset    Hypertension Mother     Osteoporosis Mother     Heart attack Father     Hypertension Sister     Diabetes type II Sister     Thyroid disease Sister     Hypertension Brother     No Known Problems Daughter     No Known Problems Maternal Grandmother     No Known Problems Maternal Grandfather     No Known Problems Paternal Grandmother     No Known Problems Paternal Grandfather     No Known Problems Sister     No Known Problems Maternal Aunt     Breast cancer Cousin        Meds/Allergies       Current Outpatient Medications:     aspirin 81 MG tablet    Cyanocobalamin (VITAMIN B12) 1000 MCG TBCR    Multiple Vitamins-Minerals (MULTI-VITAMIN/MINERALS PO)    Na Sulfate-K Sulfate-Mg Sulf (Suprep Bowel Prep Kit) 17 5-3 13-1 6 GM/177ML SOLN    naproxen sodium (ALEVE) 220 MG tablet    Omega-3 Fatty Acids (FISH OIL) 1,000 mg    No Known Allergies        Objective     Blood pressure 122/80, pulse 62, temperature 99 °F (37 2 °C), temperature source Tympanic Core, height 5' 3" (1 6 m), weight 93 4 kg (206 lb)  Body mass index is 36 49 kg/m²          PHYSICAL EXAM:      General Appearance:   Alert, cooperative, no distress   HEENT:   Normocephalic, atraumatic, anicteric      Neck:  Supple, symmetrical, trachea midline   Lungs:   Clear to auscultation bilaterally; no rales, rhonchi or wheezing; respirations unlabored    Heart[de-identified]   Regular rate and rhythm; no murmur, rub, or gallop  Abdomen:   Soft, non-tender, non-distended; normal bowel sounds; no masses, no organomegaly    Genitalia:   Deferred    Rectal:   Deferred    Extremities:  No cyanosis, clubbing or edema    Pulses:  2+ and symmetric    Skin:  No jaundice, rashes, or lesions    Lymph nodes:  No palpable cervical lymphadenopathy        Lab Results:   No visits with results within 1 Day(s) from this visit  Latest known visit with results is:   Appointment on 11/27/2019   Component Date Value    Cholesterol 11/27/2019 229*    Triglycerides 11/27/2019 149     HDL, Direct 11/27/2019 66     LDL Calculated 11/27/2019 133*    Non-HDL-Chol (CHOL-HDL) 11/27/2019 163     Hemoglobin A1C 11/27/2019 5 6     EAG 11/27/2019 114     Sodium 11/27/2019 143     Potassium 11/27/2019 4 1     Chloride 11/27/2019 107     CO2 11/27/2019 31     ANION GAP 11/27/2019 5     BUN 11/27/2019 14     Creatinine 11/27/2019 0 76     Glucose, Fasting 11/27/2019 87     Calcium 11/27/2019 9 2     AST 11/27/2019 23     ALT 11/27/2019 48     Alkaline Phosphatase 11/27/2019 81     Total Protein 11/27/2019 7 8     Albumin 11/27/2019 3 5     Total Bilirubin 11/27/2019 0 48     eGFR 11/27/2019 91     WBC 11/27/2019 5 84     RBC 11/27/2019 5 15*    Hemoglobin 11/27/2019 15 9*    Hematocrit 11/27/2019 48 9*    MCV 11/27/2019 95     MCH 11/27/2019 30 9     MCHC 11/27/2019 32 5     RDW 11/27/2019 12 4     Platelets 89/43/4353 190     MPV 11/27/2019 10 3     TSH 3RD GENERATON 11/27/2019 2  [address], 25-Hydroxy 11/27/2019 26 2*    Vitamin B-12 11/27/2019 2,786*    BLANCA 11/27/2019 Negative     Rheumatoid Factor 11/27/2019 Negative          Radiology Results:   No results found

## 2020-02-20 NOTE — PATIENT INSTRUCTIONS
The patient is scheduled at [facility]  for a colon  with Dr Eliazar Mejia on 04/23/2020  suprep prep instructions have been gone over in the office, with the patient, by the MA  The patient is aware that they will receive a call with the arrival time the day prior to procedure  The patient is aware they will need a ride to and from the procedure

## 2020-03-25 ENCOUNTER — TELEPHONE (OUTPATIENT)
Dept: GASTROENTEROLOGY | Facility: MEDICAL CENTER | Age: 50
End: 2020-03-25

## 2020-05-22 ENCOUNTER — PREP FOR PROCEDURE (OUTPATIENT)
Dept: GASTROENTEROLOGY | Facility: MEDICAL CENTER | Age: 50
End: 2020-05-22

## 2020-05-22 DIAGNOSIS — Z20.822 ENCOUNTER FOR LABORATORY TESTING FOR COVID-19 VIRUS: ICD-10-CM

## 2020-05-22 DIAGNOSIS — Z12.12 SCREENING FOR COLORECTAL CANCER: Primary | ICD-10-CM

## 2020-05-22 DIAGNOSIS — Z12.11 SCREENING FOR COLORECTAL CANCER: Primary | ICD-10-CM

## 2020-05-26 NOTE — TELEPHONE ENCOUNTER
Pt is rescheduled with dr Cathie Rowland to 7/6/20 pt has instructions and is aware to have covid testing done prior to

## 2020-06-26 ENCOUNTER — TRANSCRIBE ORDERS (OUTPATIENT)
Dept: GASTROENTEROLOGY | Facility: CLINIC | Age: 50
End: 2020-06-26

## 2020-06-29 DIAGNOSIS — Z20.822 ENCOUNTER FOR LABORATORY TESTING FOR COVID-19 VIRUS: ICD-10-CM

## 2020-06-29 PROCEDURE — U0003 INFECTIOUS AGENT DETECTION BY NUCLEIC ACID (DNA OR RNA); SEVERE ACUTE RESPIRATORY SYNDROME CORONAVIRUS 2 (SARS-COV-2) (CORONAVIRUS DISEASE [COVID-19]), AMPLIFIED PROBE TECHNIQUE, MAKING USE OF HIGH THROUGHPUT TECHNOLOGIES AS DESCRIBED BY CMS-2020-01-R: HCPCS

## 2020-07-01 LAB — SARS-COV-2 RNA SPEC QL NAA+PROBE: NOT DETECTED

## 2020-07-02 ENCOUNTER — ANESTHESIA EVENT (OUTPATIENT)
Dept: GASTROENTEROLOGY | Facility: MEDICAL CENTER | Age: 50
End: 2020-07-02

## 2020-07-06 ENCOUNTER — HOSPITAL ENCOUNTER (OUTPATIENT)
Dept: GASTROENTEROLOGY | Facility: MEDICAL CENTER | Age: 50
Setting detail: OUTPATIENT SURGERY
Discharge: HOME | End: 2020-07-06
Attending: INTERNAL MEDICINE
Payer: COMMERCIAL

## 2020-07-06 ENCOUNTER — ANESTHESIA (OUTPATIENT)
Dept: GASTROENTEROLOGY | Facility: MEDICAL CENTER | Age: 50
End: 2020-07-06

## 2020-07-06 VITALS
OXYGEN SATURATION: 96 % | TEMPERATURE: 98.4 F | WEIGHT: 210 LBS | RESPIRATION RATE: 23 BRPM | DIASTOLIC BLOOD PRESSURE: 61 MMHG | HEART RATE: 73 BPM | HEIGHT: 63 IN | BODY MASS INDEX: 37.21 KG/M2 | SYSTOLIC BLOOD PRESSURE: 128 MMHG

## 2020-07-06 DIAGNOSIS — Z12.12 SCREENING FOR COLORECTAL CANCER: ICD-10-CM

## 2020-07-06 DIAGNOSIS — Z12.11 SCREENING FOR COLORECTAL CANCER: ICD-10-CM

## 2020-07-06 PROCEDURE — 88305 TISSUE EXAM BY PATHOLOGIST: CPT | Performed by: PATHOLOGY

## 2020-07-06 PROCEDURE — 45380 COLONOSCOPY AND BIOPSY: CPT | Performed by: INTERNAL MEDICINE

## 2020-07-06 RX ORDER — ONDANSETRON 2 MG/ML
4 INJECTION INTRAMUSCULAR; INTRAVENOUS ONCE AS NEEDED
Status: DISCONTINUED | OUTPATIENT
Start: 2020-07-06 | End: 2020-07-10 | Stop reason: HOSPADM

## 2020-07-06 RX ORDER — PROPOFOL 10 MG/ML
INJECTION, EMULSION INTRAVENOUS CONTINUOUS PRN
Status: DISCONTINUED | OUTPATIENT
Start: 2020-07-06 | End: 2020-07-06 | Stop reason: SURG

## 2020-07-06 RX ORDER — PROPOFOL 10 MG/ML
INJECTION, EMULSION INTRAVENOUS AS NEEDED
Status: DISCONTINUED | OUTPATIENT
Start: 2020-07-06 | End: 2020-07-06 | Stop reason: SURG

## 2020-07-06 RX ORDER — SODIUM CHLORIDE 9 MG/ML
125 INJECTION, SOLUTION INTRAVENOUS CONTINUOUS
Status: DISCONTINUED | OUTPATIENT
Start: 2020-07-06 | End: 2020-07-10 | Stop reason: HOSPADM

## 2020-07-06 RX ADMIN — LIDOCAINE HYDROCHLORIDE 40 MG: 20 INJECTION, SOLUTION INTRAVENOUS at 13:46

## 2020-07-06 RX ADMIN — PROPOFOL 50 MG: 10 INJECTION, EMULSION INTRAVENOUS at 13:49

## 2020-07-06 RX ADMIN — SODIUM CHLORIDE 125 ML/HR: 0.9 INJECTION, SOLUTION INTRAVENOUS at 13:23

## 2020-07-06 RX ADMIN — PROPOFOL 50 MG: 10 INJECTION, EMULSION INTRAVENOUS at 14:01

## 2020-07-06 RX ADMIN — PROPOFOL 50 MG: 10 INJECTION, EMULSION INTRAVENOUS at 13:46

## 2020-07-06 RX ADMIN — PROPOFOL 100 MCG/KG/MIN: 10 INJECTION, EMULSION INTRAVENOUS at 13:47

## 2020-07-06 NOTE — H&P
History and Physical - [facility]  Josiah Wilder 44 y o  female MRN: [id number]                  HPI: Josiah Wilder is a 44y o  year old female who presents for colon cancer screening      REVIEW OF SYSTEMS: Per the HPI, and otherwise unremarkable      Historical Information   Past Medical History:   Diagnosis Date    Depression with anxiety     last assessed-2016    Hx of varicose veins     Nabothian cyst     Osteoarthritis     TMJ arthralgia      Past Surgical History:   Procedure Laterality Date     SECTION      ENDOMETRIAL BIOPSY      without cervical dilation    KNEE ARTHROPLASTY      KNEE SURGERY      TUBAL LIGATION      last assessed-10/15/2014     Social History   Social History     Substance and Sexual Activity   Alcohol Use Yes     Social History     Substance and Sexual Activity   Drug Use No     Social History     Tobacco Use   Smoking Status Never Smoker   Smokeless Tobacco Never Used     Family History   Problem Relation Age of Onset    Hypertension Mother     Osteoporosis Mother     Heart attack Father     Hypertension Sister     Diabetes type II Sister     Thyroid disease Sister     Hypertension Brother     No Known Problems Daughter     No Known Problems Maternal Grandmother     No Known Problems Maternal Grandfather     No Known Problems Paternal Grandmother     No Known Problems Paternal Grandfather     No Known Problems Sister     No Known Problems Maternal Aunt     Breast cancer Cousin        Meds/Allergies       (Not in a hospital admission)    No Known Allergies    Objective     /74   Pulse 85   Temp 98 4 °F (36 9 °C) (Temporal)   Resp 18   Ht 5' 2 5" (1 588 m)   Wt 95 3 kg (210 lb)   SpO2 98%   BMI 37 80 kg/m²       PHYSICAL EXAM    Gen: NAD  CV: RRR  CHEST: Clear  ABD: soft, NT/ND  EXT: no edema      ASSESSMENT/PLAN:  This is a 44y o  year old female here for colon cancer screening, and she is stable and optimized for her procedure

## 2020-07-06 NOTE — DISCHARGE INSTRUCTIONS
Colonoscopia   LO QUE NECESITA SABER:   Teresita colonoscopia es un procedimiento para examinar con un endoscopio el interior de brandt colon (intestino)  Roseline teresita colonoscopia, es posible que le retiren pólipos o crecimientos de tejidos  Es normal que se sienta inflamado o que tenga molestia abdominal  Usted debería estar expulsando los gases  Si tiene hemorroides o si le removieron pólipos, usted podría presentar teresita pequeña cantidad de sangrado  INSTRUCCIONES SOBRE EL [facility]:   Busque atención médica de inmediato si:   · Usted presenta teresita cantidad gaurang de cirilo sorin brillante en deisy evacuaciones intestinales  · Brandt abdomen está cynthia y firme y usted siente dolor intenso  · Usted tiene dificultad repentina para respirar  Pregúntele a brandt Trae Julio vitaminas y minerales son adecuados para usted  · Usted presenta sarpullido o urticaria  · Usted tiene fiebre dentro de las 24 horas después de brandt procedimiento  · Usted no ha tenido teresita evacuación intestinal después de 3 días de brandt procedimiento  · Usted tiene preguntas o inquietudes acerca de brandt condición o cuidado  Actividad:   · No levante nada, no se esfuerce o corra  por 3 días después de brandt procedimiento  · Descanse después de brandt procedimiento  A usted le cook administrado medicamento para relajarse  No  maneje o tome decisiones importantes hasta el día siguiente de brandt procedimiento  Regrese a deisy actividades normales según le indiquen  · Alivie los gases y la incomodidad de la inflamación  acostándose en brandt costado derecho con teresita almohada térmica sobre brandt abdomen  Es posible que necesite caminar un poco para ayudar a eliminar los gases  Coma comidas pequeñas hasta que se alivie de la inflamación  Si a usted le removieron pólipos:  Por 7 días después de brandt procedimiento:  · No  tome aspirina  · No  realice paseos largos en uzair  Ayude a prevenir el estreñimiento:   · Consuma alimentos saludables y variados    Los alimentos saludables incluyen fruta, vegetales, panes integrales, productos lácteos bajo en grasa, frijoles, judi sin grasa, y pescado  Pregunte si necesita seguir teresita dieta especial  Brandt médico puede recomendarle que coma alimentos ricos en fibra, patrick frijoles cocidos  La fibra lo ayuda a tener evacuaciones intestinales regulares  · [address] se le haya indicado  Los adultos deberían de beber entre 9 a 13 vasos de 8 onzas de líquidos cada día  Pregunte cuál es la cantidad [place] para usted  Para [facility], los mejores líquidos son [place], y [place]  · Ejercítese según indicaciones  Consulte con brandt médico acerca de cuál es el mejor régimen de ejercicio para usted  El ejercicio puede ayudar a prevenir estreñimiento, reducir brandt presión arterial y [employer]  Acuda a deisy consultas de control con brandt médico según le indicaron  Anote deisy preguntas para que se acuerde de hacerlas marcela deisy visitas  © 2017 [address] Information is for End User's use only and may not be sold, redistributed or otherwise used for commercial purposes  All illustrations and images included in CareNotes® are the copyrighted property of A D A M , Inc  or Chance Martinez  Esta información es sólo para uso en educación  Brandt intención no es darle un consejo médico sobre enfermedades o tratamientos  Colsulte con brandt Keven Revering farmacéutico antes de seguir cualquier régimen médico para saber si es seguro y efectivo para usted  Pólipos colorrectales   LO QUE NECESITA SABER:   ¿Qué son los pólipos colorrectales? Los pólipos colorrectales son pequeñas protuberancias de tejido que se encuentran en el forro del colon y recto  Wilbert Rands de los pólipos son hiperplásticos y típicamente son benignos (no cancerosos)  Ciertos tipos de pólipos llamados adenomatosos, podrían convertirse en cáncer  ¿Qué aumenta mi riesgo de pólipos colorrectales?   La causa exacta de los pólipos colorrectales no se conoce  Los siguientes podrían aumentar brandt riesgo:  · Edad avanzada    · Smiley Prow de alimentos altos en grasa y bajos en fibra     · Antecedente familiar de pólipos    · Enfermedades intestinales patrick enfermedad de Chron o colitis ulcerativa    · Un estilo de noe no saludable patrick falta de [place], fumar o beber alcohol    · Obesidad  ¿Cuáles son los signos y síntomas de los pólipos colorrectales? · Cirilo en deisy evacuaciones intestinales o sangrado proveniente de brandt recto    · Cambios en los hábitos de defecación, patrick diarrea y estreñimiento    · Dolor abdominal  ¿Cómo se diagnostican los pólipos colorrectales? Debe hacerse teresita prueba de cirilo fecal para la detección de enfermedades colorrectales si usted tiene más de 48 años de [place]  Se recomienda teresita evaluación antes si usted sufre de teresita enfermedad intestinal o si tiene antecedentes familiares de cáncer colorrectal o de pólipos  Roseline esta evaluación, revisan Kiera Byron de brandt materia fecal para cirilo, lo cual puede ser indicativo de teresita señal temprana de cáncer o pólipos  Es posible que también deba hacerse alguno de los siguientes exámenes:  · Examen digital del recto:  Brandt médico le examinará el ano y utilizará un dedo para revisar brandt recto en búsqueda de pólipos  · Enema del bario: El enema opaco es teresita radiografía del colon  Se coloca un tubo en brandt ano y se pone un líquido llamado bario por el tubo  El bario se [place] para que los médicos puedan zain el colon mejor en la radiografía  · Colonoscopia virtual:  Esta es teresita tomografía computarizada que deuce imágenes del interior de brandt colon y recto  Se inserta un tubo pequeño y flexible en brandt recto e introducen dióxido de carbono (gas) para expandir rbandt colon  [place] permite que los médicos vean claramente brandt colon y cualquier pólipo en un monitor       · Colonoscopia o sigmoidoscopia:  Estos procedimientos ayudan a que el médico patrice interior de brandt colon, mediante el uso de un tubo flexible con Chanel alaniz y Gabby Kim en castro extremidad  Marcela teresita sigmoidoscopia, el médico le revisará el recto y la parte inferior de castro colon  Marcela teresita colonoscopía, los médicos revisan el colon en castro totalidad  Es probable que los médicos extraigan teresita pequeña cantidad de tejido del colon para realizar teresita biopsia  ¿Cómo se tratan los pólipos colorrectales? · Remoción de pólipos: Se podrían remover pólipos marcela castro colonoscopía o sigmoidoscopía  · Polipectomía:  Esta es teresita cirugía para remover deisy pólipos  Usted podría necesitar cirugía abierta o laparoscópica, dependiendo del tipo, tamaño y número de pólipos que tenga  La laparoscopía se realiza al insertar un endoscopio pequeño y flexible en las incisiones que le hicieron en el abdomen  Marcela la [place], se hace teresita incisión más gaurang en castro abdomen  ¿Cuáles son los riesgos de los pólipos colorrectales? Es probable que [facility] procedimiento de la colonoscopía  Castro intestino podría perforarse (desgarrarse) al remover los pólipos  [place] se podría llevar a teresita cirugía abierta abdominal  Marcela la cirugía, usted podría sangrar demasiado o contraer teresita infección  Los pólipos adenomatosos que no se remueven podrían convertirse en cáncer y ser Kerry Exon difíciles de tratar  ¿Dónde puedo obtener [de-identified] y Kerry Exon información? · [address] (ND[address])  [address], [place] [address]  Phone: [phone]  Web Address: Escobar Pfeiffer  [facility] gov  ¿Cuándo melia comunicarme con mi médico?   · Usted tiene fiebre  · Usted tiene escalofríos, tos o se siente débil y adolorido  · Usted tiene dolor abdominal que no desaparece o aumenta después de cody castro medicamento  · Castro abdomen se encuentra inflamado  · Usted pierde peso sin proponérselo  · Usted tiene preguntas o inquietudes acerca de castro condición o cuidado    ¿Cuándo melia buscar atención inmediata o llamar al 911? · Usted tiene falta de aire repentina  · Tiene el ritmo cardíaco acelerado, la respiración acelerada o está demasiado mareado o débil para pararse  · Usted tiene dolor abdominal intenso  · Usted ve cirilo en deisy deposiciones  ACUERDOS SOBRE SHANNON CUIDADO:   Usted tiene el derecho de ayudar a planear shannon cuidado  Aprenda todo lo que pueda sobre shannon condición y patrick darle tratamiento  Discuta deisy opciones de tratamiento con deisy médicos para decidir el cuidado que usted desea recibir  Usted siempre tiene el derecho de rechazar el tratamiento  Esta información es sólo para uso en educación  Shannon intención no es darle un consejo médico sobre enfermedades o tratamientos  Colsulte con shannon Silverio Patch farmacéutico antes de seguir cualquier régimen médico para saber si es seguro y efectivo para usted  © 2017 [address] Information is for End User's use only and may not be sold, redistributed or otherwise used for commercial purposes  All illustrations and images included in CareNotes® are the copyrighted property of A D A M , Inc  or Chance Martinez

## 2020-07-06 NOTE — ANESTHESIA PREPROCEDURE EVALUATION
Review of Systems/Medical History  Patient summary reviewed  Chart reviewed      Cardiovascular  Negative cardio ROS    Pulmonary  Negative pulmonary ROS        GI/Hepatic    GERD , Bowel prep            Endo/Other    Obesity    GYN       Hematology  Negative hematology ROS      Musculoskeletal    Arthritis     Neurology  Negative neurology ROS      Psychology   Negative psychology ROS              Physical Exam    Airway    Mallampati score: II  TM Distance: >3 FB  Neck ROM: full     Dental   No notable dental hx     Cardiovascular  Comment: Negative ROS, Rhythm: regular, Rate: normal, Cardiovascular exam normal    Pulmonary  Pulmonary exam normal Breath sounds clear to auscultation,     Other Findings        Anesthesia Plan  ASA Score- 2     Anesthesia Type- IV sedation with anesthesia with ASA Monitors  Additional Monitors:   Airway Plan:         Plan Factors-    Induction- intravenous  Postoperative Plan-     Informed Consent- Anesthetic plan and risks discussed with patient

## 2020-07-24 ENCOUNTER — APPOINTMENT (OUTPATIENT)
Dept: LAB | Facility: CLINIC | Age: 50
End: 2020-07-24
Payer: COMMERCIAL

## 2020-07-24 DIAGNOSIS — E55.9 VITAMIN D DEFICIENCY: ICD-10-CM

## 2020-07-24 DIAGNOSIS — Z11.4 SCREENING FOR HIV (HUMAN IMMUNODEFICIENCY VIRUS): ICD-10-CM

## 2020-07-24 DIAGNOSIS — R79.89 ELEVATED HOMOCYSTEINE: ICD-10-CM

## 2020-07-24 DIAGNOSIS — R60.9 EDEMA, UNSPECIFIED TYPE: ICD-10-CM

## 2020-07-24 DIAGNOSIS — E78.5 DYSLIPIDEMIA: ICD-10-CM

## 2020-07-24 DIAGNOSIS — D75.1 ERYTHROCYTOSIS: ICD-10-CM

## 2020-07-24 DIAGNOSIS — R79.89 HIGH SERUM VITAMIN B12: ICD-10-CM

## 2020-07-24 LAB
25(OH)D3 SERPL-MCNC: 18.2 NG/ML (ref 30–100)
BACTERIA UR QL AUTO: ABNORMAL /HPF
BILIRUB UR QL STRIP: NEGATIVE
CHOLEST SERPL-MCNC: 238 MG/DL (ref 50–200)
CLARITY UR: ABNORMAL
COLOR UR: YELLOW
ERYTHROCYTE [DISTWIDTH] IN BLOOD BY AUTOMATED COUNT: 12.9 % (ref 11.6–15.1)
GLUCOSE UR STRIP-MCNC: NEGATIVE MG/DL
HCT VFR BLD AUTO: 50.6 % (ref 34.8–46.1)
HCYS SERPL-SCNC: 9.5 UMOL/L (ref 3.7–11.2)
HDLC SERPL-MCNC: 59 MG/DL
HGB BLD-MCNC: 15.7 G/DL (ref 11.5–15.4)
HGB UR QL STRIP.AUTO: ABNORMAL
HYALINE CASTS #/AREA URNS LPF: ABNORMAL /LPF
KETONES UR STRIP-MCNC: NEGATIVE MG/DL
LDLC SERPL CALC-MCNC: 145 MG/DL (ref 0–100)
LEUKOCYTE ESTERASE UR QL STRIP: ABNORMAL
MCH RBC QN AUTO: 31 PG (ref 26.8–34.3)
MCHC RBC AUTO-ENTMCNC: 31 G/DL (ref 31.4–37.4)
MCV RBC AUTO: 100 FL (ref 82–98)
NITRITE UR QL STRIP: NEGATIVE
NON-SQ EPI CELLS URNS QL MICRO: ABNORMAL /HPF
PH UR STRIP.AUTO: 6 [PH]
PLATELET # BLD AUTO: 191 THOUSANDS/UL (ref 149–390)
PMV BLD AUTO: 11.5 FL (ref 8.9–12.7)
PROT UR STRIP-MCNC: NEGATIVE MG/DL
RBC # BLD AUTO: 5.06 MILLION/UL (ref 3.81–5.12)
RBC #/AREA URNS AUTO: ABNORMAL /HPF
SP GR UR STRIP.AUTO: 1.02 (ref 1–1.03)
TRIGL SERPL-MCNC: 170 MG/DL
UROBILINOGEN UR QL STRIP.AUTO: 0.2 E.U./DL
VIT B12 SERPL-MCNC: 819 PG/ML (ref 100–900)
WBC # BLD AUTO: 5.43 THOUSAND/UL (ref 4.31–10.16)
WBC #/AREA URNS AUTO: ABNORMAL /HPF

## 2020-07-24 PROCEDURE — 85027 COMPLETE CBC AUTOMATED: CPT

## 2020-07-24 PROCEDURE — 82306 VITAMIN D 25 HYDROXY: CPT

## 2020-07-24 PROCEDURE — 36415 COLL VENOUS BLD VENIPUNCTURE: CPT

## 2020-07-24 PROCEDURE — 82607 VITAMIN B-12: CPT

## 2020-07-24 PROCEDURE — 87086 URINE CULTURE/COLONY COUNT: CPT

## 2020-07-24 PROCEDURE — 80061 LIPID PANEL: CPT

## 2020-07-24 PROCEDURE — 81001 URINALYSIS AUTO W/SCOPE: CPT

## 2020-07-24 PROCEDURE — 83090 ASSAY OF HOMOCYSTEINE: CPT

## 2020-07-24 PROCEDURE — 87389 HIV-1 AG W/HIV-1&-2 AB AG IA: CPT

## 2020-07-25 LAB — BACTERIA UR CULT: NORMAL

## 2020-07-26 LAB — HIV 1+2 AB+HIV1 P24 AG SERPL QL IA: NORMAL

## 2020-07-29 ENCOUNTER — OFFICE VISIT (OUTPATIENT)
Dept: FAMILY MEDICINE CLINIC | Facility: CLINIC | Age: 50
End: 2020-07-29
Payer: COMMERCIAL

## 2020-07-29 VITALS
HEIGHT: 63 IN | HEART RATE: 90 BPM | TEMPERATURE: 99.4 F | BODY MASS INDEX: 37.92 KG/M2 | SYSTOLIC BLOOD PRESSURE: 122 MMHG | DIASTOLIC BLOOD PRESSURE: 70 MMHG | RESPIRATION RATE: 17 BRPM | OXYGEN SATURATION: 98 % | WEIGHT: 214 LBS

## 2020-07-29 DIAGNOSIS — E66.09 CLASS 2 OBESITY DUE TO EXCESS CALORIES WITHOUT SERIOUS COMORBIDITY WITH BODY MASS INDEX (BMI) OF 37.0 TO 37.9 IN ADULT: ICD-10-CM

## 2020-07-29 DIAGNOSIS — Z12.39 SCREENING FOR BREAST CANCER: ICD-10-CM

## 2020-07-29 DIAGNOSIS — E78.5 DYSLIPIDEMIA: ICD-10-CM

## 2020-07-29 DIAGNOSIS — E55.9 VITAMIN D DEFICIENCY: Primary | ICD-10-CM

## 2020-07-29 DIAGNOSIS — R79.89 HIGH SERUM VITAMIN B12: ICD-10-CM

## 2020-07-29 PROCEDURE — 99214 OFFICE O/P EST MOD 30 MIN: CPT | Performed by: FAMILY MEDICINE

## 2020-07-29 PROCEDURE — 1036F TOBACCO NON-USER: CPT | Performed by: FAMILY MEDICINE

## 2020-07-29 PROCEDURE — 3008F BODY MASS INDEX DOCD: CPT | Performed by: FAMILY MEDICINE

## 2020-07-29 RX ORDER — ERGOCALCIFEROL 1.25 MG/1
50000 CAPSULE ORAL WEEKLY
Qty: 12 CAPSULE | Refills: 0 | Status: SHIPPED | OUTPATIENT
Start: 2020-07-29 | End: 2022-02-17

## 2020-07-29 NOTE — ASSESSMENT & PLAN NOTE
B12 improved from 2 786 to 819    Advised patient to continue to refrain from B12 supplements, as her B12 level is normal

## 2020-07-29 NOTE — PATIENT INSTRUCTIONS
Dieta con alto contenido de [place]   CUIDADO AMBULATORIO:   [place] dieta con alto contenido de fibra  incluye alimentos con teresita susu cantidad de fibra  La fibra es la parte de las frutas, los vegetales y los granos, que no se descompone al ser ingerida por brandt cuerpo  La fibra ayuda a regular las evacuaciones intestinales  La fibra además ayuda a bajar el colesterol, controlar la glucosa en la cirilo en las personas que sufren de diabetes y para aliviar el estreñimiento  También la fibra podría ayudarle a controlar brandt peso debido a que le da la sensación de llenura más rápidamente  La mayoría de los adultos deberían consumir entre 25 a 35 gramos de fibra al día  Consulte con brandt dietista o médico sobre la cantidad de fibra que usted necesita  Buenas palma de fibra:   · Alimentos que contienen al menos 4 gramos de fibra por porción:      ¨ ? a ½ de teresita taza de cereal con alto contenido de fibra (charles la etiqueta nutricional en la caja)    ¨ ½ taza de moras o frambuesas    ¨ 4 ciruelas pasas    ¨ 1 alcachofa cocida    ¨ ½ taza de legumbres cocidas, patrick lentejas o frijoles rojos o pintos    · [place] contienen 1 a 3 gramos de [place] por porción:      ¨ 1 rebanada de pan de kemal integral, pan integral de rubin o pan de rubin    ¨ ½ taza de arroz integral cocido    ¨ 4 galletas integrales    ¨ 1 taza de familia    ¨ ½ taza de cereal con 1 a 3 gramos de [place] por porción (charles la etiqueta nutricional en la caja)    ¨ 1 porción pequeña de fruta patrick manzana, banana, galdino, kiwi o naranja    ¨ 3 dátiles    ¨ ½ taza de albaricoques enlatados, ensalada de frutas, duraznos o peras    ¨ ½ taza de verduras crudas o cocidas, patrick zanahorias, coliflor, repollo, espinaca, calabaza o maíz  Formas en que puede aumentar la fibra en brandt dieta:   · Escoja arroz integral o heena en vez de arroz ventura      · Use harina de grano integral en las recetas en vez de harina kareem  · Rc Chess y arvejas a los guisos o las sopas  · Janice Lieu y verduras frescas con la cascara en vez de jugos  Otras pautas dietéticas que debe seguir:   · Suraj Li a castro dieta lentamente  Usted podría presentar malestar o inflamación estomacal y gases si añade fibra a castro dieta demasiado rápido  · Hue mucho líquido a medida que agrega fibra a castro dieta  Es posible que tenga náuseas o desarrolle estreñimiento si no deuce suficiente agua  Pregunte cuánto líquido debe cody cada día y cuáles líquidos son los más adecuados para usted  © 2017 [facility] Information is for End User's use only and may not be sold, redistributed or otherwise used for commercial purposes  All illustrations and images included in CareNotes® are the copyrighted property of [employer]  or Chance Martinez  Esta información es sólo para uso en educación  Castro intención no es darle un consejo médico sobre enfermedades o tratamientos  Colsulte con castro [place]i [place] farmacéutico antes de seguir cualquier régimen médico para saber si es seguro y efectivo para usted

## 2020-07-29 NOTE — PROGRESS NOTES
Assessment/Plan:    Dyslipidemia  Reviewed lab results with patient  Patient wants to work on diet and exercise to improve her lipids  Reviewed with patient that diet and exercise was her plan at the last visit, and her lipids worsened over the past 6 months  Patient plans to commit to making therapeutic lifestyle changes to improve her lipids  Will plan to reassess with lab and follow-up in 6 months  If lipids continue to be elevated, patient agrees to start medication  High serum vitamin B12  B12 improved from 2 786 to 819  Advised patient to continue to refrain from B12 supplements, as her B12 level is normal     Vitamin D deficiency  Patient's vitamin D worsened from 26 2 to 18 2  She notes that she has not been taking the OTC vitamin D supplement as recommended  Will prescribed ergocalciferol 50,000 IU daily for 12 weeks  Patient may also take OTC vitamin D 2,000 IU daily  Will plan to reassess vitamin D level in 3 months  Obese  Discussed therapeutic lifestyle changes with patient, and encouraged her to work on this issue  Patient would like to check TSH with next labs  Will check TSH with next labs  Reviewed with patient that her TSH levels have been normal since 2013  Diagnoses and all orders for this visit:    Vitamin D deficiency  -     ergocalciferol (VITAMIN D2) 50,000 units; Take 1 capsule (50,000 Units total) by mouth once a week  -     Vitamin D 25 hydroxy; Future    Screening for breast cancer  -     Cancel: Mammo screening bilateral w cad; Future    High serum vitamin B12  -     Vitamin B12; Future  -     CBC and Platelet; Future    Class 2 obesity due to excess calories without serious comorbidity with body mass index (BMI) of 37 0 to 37 9 in adult  -     TSH, 3rd generation with Free T4 reflex; Future    Dyslipidemia  -     Comprehensive metabolic panel; Future  -     Lipid Panel with Direct LDL reflex; Future          Subjective:      Patient ID: Laure Jennings is a 44 y o  female  Patient presents to clinic for a follow-up visit  Patient completed the labs ordered, and is here to review results  All results are reviewed with patient, and all questions were answered  Patient has no new complaints today  Hyperlipidemia   This is a chronic problem  The current episode started more than 1 year ago  The problem is uncontrolled  Exacerbating diseases include obesity  She has no history of diabetes  Factors aggravating her hyperlipidemia include fatty foods  Pertinent negatives include no chest pain or shortness of breath  Current antihyperlipidemic treatment includes diet change  The current treatment provides no improvement of lipids  Compliance problems include adherence to diet and adherence to exercise  Risk factors for coronary artery disease include dyslipidemia and obesity  The following portions of the patient's history were reviewed and updated as appropriate: allergies, current medications, past family history, past medical history, past social history, past surgical history and problem list     Review of Systems   Constitutional: 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)  Respiratory: Negative for shortness of breath  Cardiovascular: Negative for chest pain  Objective:      /70 (BP Location: Left arm, Patient Position: Sitting, Cuff Size: Standard)   Pulse 90   Temp 99 4 °F (37 4 °C) (Tympanic)   Resp 17   Ht 5' 3" (1 6 m)   Wt 97 1 kg (214 lb)   SpO2 98%   BMI 37 91 kg/m²          Physical Exam   Constitutional: She is oriented to person, place, and time  She appears well-developed and well-nourished  She is cooperative  No distress  HENT:   Head: Normocephalic and atraumatic  Right Ear: Hearing and external ear normal    Left Ear: Hearing and external ear normal    Eyes: Conjunctivae and lids are normal    Cardiovascular: Normal rate  Pulmonary/Chest: Effort normal  No respiratory distress  Musculoskeletal: Normal range of motion  Neurological: She is alert and oriented to person, place, and time  She exhibits normal muscle tone  Gait normal    Skin: Skin is warm, dry and intact  Psychiatric: She has a normal mood and affect  Her speech is normal and behavior is normal  Thought content normal    Nursing note and vitals reviewed  Lab Results   Component Value Date    CHOLESTEROL 238 (H) 07/24/2020    CHOLESTEROL 229 (H) 11/27/2019    CHOLESTEROL 178 03/17/2018     Lab Results   Component Value Date    HDL 59 07/24/2020    HDL 66 11/27/2019    HDL 56 03/17/2018     Lab Results   Component Value Date    TRIG 170 (H) 07/24/2020    TRIG 149 11/27/2019    TRIG 138 03/17/2018     Lab Results   Component Value Date    [place] 163 11/27/2019     Lab Results   Component Value Date    LDLCALC 145 (H) 07/24/2020     Lab Results   Component Value Date    PCQDCSJS65 819 07/24/2020     Lab Results   Component Value Date     10/31/2013    SODIUM 143 11/27/2019    K 4 1 11/27/2019     11/27/2019    CO2 31 11/27/2019    ANIONGAP 7 10/31/2013    AGAP 5 11/27/2019    BUN 14 11/27/2019    CREATININE 0 76 11/27/2019    GLUC 87 02/03/2016    GLUF 87 11/27/2019    CALCIUM 9 2 11/27/2019    AST 23 11/27/2019    ALT 48 11/27/2019    ALKPHOS 81 11/27/2019    PROT 7 9 10/31/2013    TP 7 8 11/27/2019    BILITOT 0 46 10/31/2013    TBILI 0 48 11/27/2019    EGFR 91 11/27/2019     BMI Counseling: Body mass index is 37 91 kg/m²  The BMI is above normal  Nutrition recommendations include encouraging healthy choices of fruits and vegetables and limiting drinks that contain sugar  Exercise recommendations include exercising 3-5 times per week and strength training exercises

## 2020-07-29 NOTE — ASSESSMENT & PLAN NOTE
Discussed therapeutic lifestyle changes with patient, and encouraged her to work on this issue  Patient would like to check TSH with next labs  Will check TSH with next labs  Reviewed with patient that her TSH levels have been normal since 2013

## 2020-07-29 NOTE — ASSESSMENT & PLAN NOTE
Patient's vitamin D worsened from 26 2 to 18 2  She notes that she has not been taking the OTC vitamin D supplement as recommended  Will prescribed ergocalciferol 50,000 IU daily for 12 weeks  Patient may also take OTC vitamin D 2,000 IU daily  Will plan to reassess vitamin D level in 3 months

## 2020-07-29 NOTE — ASSESSMENT & PLAN NOTE
Reviewed lab results with patient  Patient wants to work on diet and exercise to improve her lipids  Reviewed with patient that diet and exercise was her plan at the last visit, and her lipids worsened over the past 6 months  Patient plans to commit to making therapeutic lifestyle changes to improve her lipids  Will plan to reassess with lab and follow-up in 6 months  If lipids continue to be elevated, patient agrees to start medication

## 2020-07-30 ENCOUNTER — HOSPITAL ENCOUNTER (OUTPATIENT)
Dept: RADIOLOGY | Age: 50
Discharge: HOME | End: 2020-07-30
Payer: COMMERCIAL

## 2020-07-30 VITALS — WEIGHT: 214 LBS | HEIGHT: 63 IN | BODY MASS INDEX: 37.92 KG/M2

## 2020-07-30 DIAGNOSIS — Z12.31 ENCOUNTER FOR SCREENING MAMMOGRAM FOR MALIGNANT NEOPLASM OF BREAST: ICD-10-CM

## 2020-07-30 PROCEDURE — 77063 BREAST TOMOSYNTHESIS BI: CPT

## 2020-07-30 PROCEDURE — 77067 SCR MAMMO BI INCL CAD: CPT

## 2021-03-10 DIAGNOSIS — Z23 ENCOUNTER FOR IMMUNIZATION: ICD-10-CM

## 2021-03-17 ENCOUNTER — IMMUNIZATIONS (OUTPATIENT)
Dept: FAMILY MEDICINE CLINIC | Facility: HOSPITAL | Age: 51
End: 2021-03-17

## 2021-03-17 DIAGNOSIS — Z23 ENCOUNTER FOR IMMUNIZATION: Primary | ICD-10-CM

## 2021-03-17 PROCEDURE — 0011A SARS-COV-2 / COVID-19 MRNA VACCINE (MODERNA) 100 MCG: CPT

## 2021-03-17 PROCEDURE — 91301 SARS-COV-2 / COVID-19 MRNA VACCINE (MODERNA) 100 MCG: CPT

## 2021-04-15 ENCOUNTER — IMMUNIZATIONS (OUTPATIENT)
Dept: FAMILY MEDICINE CLINIC | Facility: HOSPITAL | Age: 51
End: 2021-04-15

## 2021-04-15 DIAGNOSIS — Z23 ENCOUNTER FOR IMMUNIZATION: Primary | ICD-10-CM

## 2021-04-15 PROCEDURE — 0012A SARS-COV-2 / COVID-19 MRNA VACCINE (MODERNA) 100 MCG: CPT

## 2021-04-15 PROCEDURE — 91301 SARS-COV-2 / COVID-19 MRNA VACCINE (MODERNA) 100 MCG: CPT

## 2021-06-22 ENCOUNTER — OFFICE VISIT (OUTPATIENT)
Dept: FAMILY MEDICINE CLINIC | Facility: CLINIC | Age: 51
End: 2021-06-22
Payer: COMMERCIAL

## 2021-06-22 VITALS
HEART RATE: 71 BPM | WEIGHT: 204 LBS | RESPIRATION RATE: 16 BRPM | SYSTOLIC BLOOD PRESSURE: 120 MMHG | OXYGEN SATURATION: 98 % | HEIGHT: 63 IN | TEMPERATURE: 98.6 F | DIASTOLIC BLOOD PRESSURE: 84 MMHG | BODY MASS INDEX: 36.14 KG/M2

## 2021-06-22 DIAGNOSIS — Z12.31 ENCOUNTER FOR SCREENING MAMMOGRAM FOR MALIGNANT NEOPLASM OF BREAST: Primary | ICD-10-CM

## 2021-06-22 DIAGNOSIS — L98.9 SKIN LESION OF BACK: Primary | ICD-10-CM

## 2021-06-22 PROCEDURE — 3725F SCREEN DEPRESSION PERFORMED: CPT | Performed by: FAMILY MEDICINE

## 2021-06-22 PROCEDURE — 1036F TOBACCO NON-USER: CPT | Performed by: FAMILY MEDICINE

## 2021-06-22 PROCEDURE — 99213 OFFICE O/P EST LOW 20 MIN: CPT | Performed by: FAMILY MEDICINE

## 2021-06-22 PROCEDURE — 3008F BODY MASS INDEX DOCD: CPT | Performed by: FAMILY MEDICINE

## 2021-06-22 NOTE — PROGRESS NOTES
Assessment/Plan:    No problem-specific Assessment & Plan notes found for this encounter  Diagnoses and all orders for this visit:    Skin lesion of back  Comments:  Appears benign  Referral placed with derm  Orders:  -     Ambulatory referral to Dermatology; Future          Subjective: lesion on back      Patient ID: Alexys Beckman is a 44 y o  female  HPI  Here for a lesion on the back that she noticed a couple months ago  Denies pain or itching  She has multiple small hemangiomas on the chest  A maternal cousin had skin cancer  Pt denies a first degree relative with skin cancer or a personal ho skin cancer  The following portions of the patient's history were reviewed and updated as appropriate: allergies, current medications, past family history, past medical history, past social history, past surgical history and problem list     Review of Systems   Constitutional: Negative for fever and unexpected weight change  HENT: Negative for ear pain, sore throat and trouble swallowing  Eyes: Negative for pain and visual disturbance  Respiratory: Negative for cough, chest tightness, shortness of breath and wheezing  Cardiovascular: Negative for chest pain  Gastrointestinal: Negative for abdominal distention, abdominal pain, blood in stool, constipation, diarrhea, nausea and vomiting  Endocrine: Negative for polydipsia and polyuria  Genitourinary: Negative for dysuria and hematuria  Musculoskeletal: Negative for back pain and myalgias  Skin: Negative for rash  Neurological: Negative for syncope and headaches  Psychiatric/Behavioral: Negative for suicidal ideas           PHQ-9 Depression Screening    PHQ-9:   Frequency of the following problems over the past two weeks:      Little interest or pleasure in doing things: 0 - not at all  Feeling down, depressed, or hopeless: 0 - not at all  PHQ-2 Score: 0           Objective:      /84 (BP Location: Left arm, Patient Position: Sitting, Cuff Size: Large)   Pulse 71   Temp 98 6 °F (37 °C) (Tympanic)   Resp 16   Ht 5' 3" (1 6 m)   Wt 92 5 kg (204 lb)   SpO2 98%   BMI 36 14 kg/m²          Physical Exam  Constitutional:       Appearance: She is well-developed  HENT:      Head: Normocephalic and atraumatic  Right Ear: External ear normal       Left Ear: External ear normal    Eyes:      General: No scleral icterus  Conjunctiva/sclera: Conjunctivae normal    Pulmonary:      Effort: Pulmonary effort is normal  No respiratory distress  Musculoskeletal:      Cervical back: Normal range of motion  Skin:     Findings: Lesion (One centimetre cirular, raised 4mm papule of the mid upper back, the lesion is red and seems to be fluid filled  No surrounding erythema ) present  Neurological:      Mental Status: She is alert and oriented to person, place, and time

## 2021-06-22 NOTE — PROGRESS NOTES
BMI Counseling: Body mass index is 36 14 kg/m²  The BMI is above normal  Nutrition recommendations include reducing portion sizes, decreasing overall calorie intake, 3-5 servings of fruits/vegetables daily and reducing fast food intake  Exercise recommendations include exercising 3-5 times per week

## 2021-07-23 ENCOUNTER — HOSPITAL ENCOUNTER (OUTPATIENT)
Dept: MAMMOGRAPHY | Facility: MEDICAL CENTER | Age: 51
Discharge: HOME | End: 2021-07-23
Payer: COMMERCIAL

## 2021-07-23 VITALS — WEIGHT: 204 LBS | HEIGHT: 63 IN | BODY MASS INDEX: 36.14 KG/M2

## 2021-07-23 DIAGNOSIS — Z12.31 ENCOUNTER FOR SCREENING MAMMOGRAM FOR MALIGNANT NEOPLASM OF BREAST: ICD-10-CM

## 2021-07-23 PROCEDURE — 77063 BREAST TOMOSYNTHESIS BI: CPT

## 2021-07-23 PROCEDURE — 77067 SCR MAMMO BI INCL CAD: CPT

## 2021-07-27 ENCOUNTER — LAB (OUTPATIENT)
Dept: LAB | Facility: HOSPITAL | Age: 51
End: 2021-07-27
Payer: COMMERCIAL

## 2021-07-27 DIAGNOSIS — R79.89 HIGH SERUM VITAMIN B12: ICD-10-CM

## 2021-07-27 DIAGNOSIS — E55.9 VITAMIN D DEFICIENCY: ICD-10-CM

## 2021-07-27 DIAGNOSIS — E78.5 DYSLIPIDEMIA: ICD-10-CM

## 2021-07-27 DIAGNOSIS — E66.09 CLASS 2 OBESITY DUE TO EXCESS CALORIES WITHOUT SERIOUS COMORBIDITY WITH BODY MASS INDEX (BMI) OF 37.0 TO 37.9 IN ADULT: ICD-10-CM

## 2021-07-27 LAB
25(OH)D3 SERPL-MCNC: 28.4 NG/ML (ref 30–100)
ALBUMIN SERPL BCP-MCNC: 3.8 G/DL (ref 3.5–5)
ALP SERPL-CCNC: 82 U/L (ref 46–116)
ALT SERPL W P-5'-P-CCNC: 38 U/L (ref 12–78)
ANION GAP SERPL CALCULATED.3IONS-SCNC: 8 MMOL/L (ref 4–13)
AST SERPL W P-5'-P-CCNC: 19 U/L (ref 5–45)
BILIRUB SERPL-MCNC: 0.54 MG/DL (ref 0.2–1)
BUN SERPL-MCNC: 15 MG/DL (ref 5–25)
CALCIUM SERPL-MCNC: 9.3 MG/DL (ref 8.3–10.1)
CHLORIDE SERPL-SCNC: 106 MMOL/L (ref 100–108)
CHOLEST SERPL-MCNC: 206 MG/DL (ref 50–200)
CO2 SERPL-SCNC: 27 MMOL/L (ref 21–32)
CREAT SERPL-MCNC: 0.72 MG/DL (ref 0.6–1.3)
ERYTHROCYTE [DISTWIDTH] IN BLOOD BY AUTOMATED COUNT: 13.2 % (ref 11.6–15.1)
GFR SERPL CREATININE-BSD FRML MDRD: 97 ML/MIN/1.73SQ M
GLUCOSE P FAST SERPL-MCNC: 97 MG/DL (ref 65–99)
HCT VFR BLD AUTO: 43.8 % (ref 34.8–46.1)
HDLC SERPL-MCNC: 71 MG/DL
HGB BLD-MCNC: 14.3 G/DL (ref 11.5–15.4)
LDLC SERPL CALC-MCNC: 123 MG/DL (ref 0–100)
MCH RBC QN AUTO: 30.6 PG (ref 26.8–34.3)
MCHC RBC AUTO-ENTMCNC: 32.6 G/DL (ref 31.4–37.4)
MCV RBC AUTO: 94 FL (ref 82–98)
PLATELET # BLD AUTO: 174 THOUSANDS/UL (ref 149–390)
PMV BLD AUTO: 10.8 FL (ref 8.9–12.7)
POTASSIUM SERPL-SCNC: 4.2 MMOL/L (ref 3.5–5.3)
PROT SERPL-MCNC: 8 G/DL (ref 6.4–8.2)
RBC # BLD AUTO: 4.68 MILLION/UL (ref 3.81–5.12)
SODIUM SERPL-SCNC: 141 MMOL/L (ref 136–145)
TRIGL SERPL-MCNC: 61 MG/DL
TSH SERPL DL<=0.05 MIU/L-ACNC: 2.14 UIU/ML (ref 0.36–3.74)
VIT B12 SERPL-MCNC: 736 PG/ML (ref 100–900)
WBC # BLD AUTO: 7.22 THOUSAND/UL (ref 4.31–10.16)

## 2021-07-27 PROCEDURE — 36415 COLL VENOUS BLD VENIPUNCTURE: CPT

## 2021-07-27 PROCEDURE — 82306 VITAMIN D 25 HYDROXY: CPT

## 2021-07-27 PROCEDURE — 84443 ASSAY THYROID STIM HORMONE: CPT

## 2021-07-27 PROCEDURE — 85027 COMPLETE CBC AUTOMATED: CPT

## 2021-07-27 PROCEDURE — 82607 VITAMIN B-12: CPT

## 2021-07-27 PROCEDURE — 80053 COMPREHEN METABOLIC PANEL: CPT

## 2021-07-27 PROCEDURE — 80061 LIPID PANEL: CPT

## 2021-09-15 ENCOUNTER — CONSULT (OUTPATIENT)
Dept: DERMATOLOGY | Facility: CLINIC | Age: 51
End: 2021-09-15
Payer: COMMERCIAL

## 2021-09-15 VITALS — BODY MASS INDEX: 36.97 KG/M2 | WEIGHT: 208.7 LBS | TEMPERATURE: 96.8 F

## 2021-09-15 DIAGNOSIS — D48.5 NEOPLASM OF UNCERTAIN BEHAVIOR OF SKIN: ICD-10-CM

## 2021-09-15 PROCEDURE — 99243 OFF/OP CNSLTJ NEW/EST LOW 30: CPT | Performed by: DERMATOLOGY

## 2021-09-15 PROCEDURE — 88305 TISSUE EXAM BY PATHOLOGIST: CPT | Performed by: STUDENT IN AN ORGANIZED HEALTH CARE EDUCATION/TRAINING PROGRAM

## 2021-09-15 PROCEDURE — 11102 TANGNTL BX SKIN SINGLE LES: CPT | Performed by: DERMATOLOGY

## 2021-09-15 NOTE — PATIENT INSTRUCTIONS
Assessment and Plan:   I have discussed with the patient that a sample of skin via a "skin biopsy would be potentially helpful to further make a specific diagnosis under the microscope   Based on a thorough discussion of this condition and the management approach to it (including a comprehensive discussion of the known risks, side effects and potential benefits of treatment), the patient (family) agrees to implement the following specific plan:    o Procedure:  Skin Biopsy  After a thorough discussion of treatment options and risk/benefits/alternatives (including but not limited to local pain, scarring, dyspigmentation, blistering, possible superinfection, and inability to confirm a diagnosis via histopathology), verbal and written consent were obtained and portion of the rash was biopsied for tissue sample  See below for consent that was obtained from patient and subsequent Procedure Note  INFORMED CONSENT DISCUSSION AND POST-OPERATIVE INSTRUCTIONS FOR PATIENT    I   RATIONALE FOR PROCEDURE  I understand that a skin biopsy allows the Dermatologist to test a lesion or rash under the microscope to obtain a diagnosis  It usually involves numbing the area with numbing medication and removing a small piece of skin; sometimes the area will be closed with sutures  In this specific procedure, sutures are not usually needed  If any sutures are placed, then they are usually need to be removed in 2 weeks or less  I understand that my Dermatologist recommends that a skin "shave" biopsy be performed today  A local anesthetic, similar to the kind that a dentist uses when filling a cavity, will be injected with a very small needle into the skin area to be sampled  The injected skin and tissue underneath "will go to sleep and become numb so no pain should be felt afterwards    An instrument shaped like a tiny "razor blade" (shave biopsy instrument) will be used to cut a small piece of tissue and skin from the area so that a sample of tissue can be taken and examined more closely under the microscope  A slight amount of bleeding will occur, but it will be stopped with direct pressure and a pressure bandage and any other appropriate methods  I understands that a scar will form where the wound was created  Surgical ointment will be applied to help protect the wound  Sutures are not usually needed  II   RISKS AND POTENTIAL COMPLICATIONS   I understand the risks and potential complications of a skin biopsy include but are not limited to the following:   Bleeding   Infection   Pain   Scar/keloid   Skin discoloration   Incomplete Removal   Recurrence   Nerve Damage/Numbness/Loss of Function   Allergic Reaction to Anesthesia   Biopsies are diagnostic procedures and based on findings additional treatment or evaluation may be required   Loss or destruction of specimen resulting in no additional findings    My Dermatologist has explained to me the nature of the condition, the nature of the procedure, and the benefits to be reasonably expected compared with alternative approaches  My Dermatologist has discussed the likelihood of major risks or complications of this procedure including the specific risks listed above, such as bleeding, infection, and scarring/keloid  I understand that a scar is expected after this procedure  I understand that my physician cannot predict if the scar will form a "keloid," which extends beyond the borders of the wound that is created  A keloid is a thick, painful, and bumpy scar  A keloid can be difficult to treat, as it does not always respond well to therapy, which includes injecting cortisone directly into the keloid every few weeks  While this usually reduces the pain and size of the scar, it does not eliminate it  I understand that photographs may be taken before and after the procedure    These will be maintained as part of the medical providers confidential records and may not be made available to me  I further authorize the medical provider to use the photographs for teaching purposes or to illustrate scientific papers, books, or lectures if in his/her judgment, medical research, education, or science may benefit from its use  I have had an opportunity to fully inquire about the risks and benefits of this procedure and its alternatives  I have been given ample time and opportunity to ask questions and to seek a second opinion if I wished to do so  I acknowledge that there have specifically been no guarantees as to the cosmetic results from the procedure  I am aware that with any procedure there is always the possibility of an unexpected complication  III  POST-PROCEDURAL CARE (WHAT YOU WILL NEED TO DO "AFTER THE BIOPSY" TO OPTIMIZE HEALING)     Keep the area clean and dry  Try NOT to remove the bandage or get it wet for the first 24 hours   Gently clean the area and apply surgical ointment (such as Vaseline petrolatum ointment, which is available "over the counter" and not a prescription) to the biopsy site for up to 2 weeks straight  This acts to protect the wound from the outside world   Sutures are not usually placed in this procedure  If any sutures were placed, return for suture removal as instructed (generally 1 week for the face, 2 weeks for the body)   Take Acetaminophen (Tylenol) for discomfort, if no contraindications  Ibuprofen or aspirin could make bleeding worse   Call our office immediately for signs of infection: fever, chills, increased redness, warmth, tenderness, discomfort/pain, or pus or foul smell coming from the wound  WHAT TO DO IF THERE IS ANY BLEEDING? If a small amount of bleeding is noticed, place a clean cloth over the area and apply firm pressure for ten minutes  Check the wound after 10 minutes of direct pressure  If bleeding persists, try one more time for an additional 10 minutes of direct pressure on the area  If the bleeding becomes heavier or does not stop after the second attempt, or if you have any other questions about this procedure, then please call your [address] [place], [place] [address]ke's Dermatologist by calling [phone] (SKIN)  I hereby acknowledge that I have reviewed and verified the site with my Dermatologist and have requested and authorized my Dermatologist to proceed with the procedure

## 2021-09-15 NOTE — PROGRESS NOTES
[facility] Note     Patient Name: Lázaro Dodd  Encounter Date: 09/15/21       Have you been cared for by a [facility]ologist in the last 3 years and, if so, which one? No    · Have you traveled outside of the [address] [place] [address] in the past 3 months or outside of the [facility] area in the last 2 weeks? No     May we call your Preferred Phone number to discuss your specific medical information? Yes     May we leave a detailed message that includes your specific medical information? Yes      Today's Chief Concerns:   Concern #1:  Lump back    Concern #2:      Past Medical History:  Have you personally ever had or currently have any of the following? · Skin cancer (such as Melanoma, Basal Cell Carcinoma, Squamous Cell Carcinoma? (If Yes, please provide more detail)- No  · Eczema: No  · Psoriasis: No  · HIV/AIDS: No  · Hepatitis B or C: No  · Tuberculosis: No  · Systemic Immunosuppression such as Diabetes, Biologic or Immunotherapy, Chemotherapy, Organ Transplantation, Bone Marrow Transplantation (If YES, please provide more detail): No  · Radiation Treatment (If YES, please provide more detail): No  · Any other major medical conditions/concerns? (If Yes, which types)- No    Social History:     What is/was your primary occupation?       What are your hobbies/past-times? None     Family History:  Have any of your "first degree relatives" (parent, brother, sister, or child) had any of the following       · Skin cancer such as Melanoma or Merkel Cell Carcinoma or Pancreatic Cancer? No  · Eczema, Asthma, Hay Fever or Seasonal Allergies: No  · Psoriasis or Psoriatic Arthritis: No  · Do any other medical conditions seem to run in your family? If Yes, what condition and which relatives?   YES, Mother- Hypertension     Current Medications:         Current Outpatient Medications:     aspirin 81 MG tablet, Take by mouth (Patient not taking: Reported on 6/22/2021), Disp: , Rfl:     Cyanocobalamin (VITAMIN B12) 1000 MCG TBCR, Take 1 tablet by mouth daily (Patient not taking: Reported on 6/22/2021), Disp: , Rfl:     ergocalciferol (VITAMIN D2) 50,000 units, Take 1 capsule (50,000 Units total) by mouth once a week (Patient not taking: Reported on 6/22/2021), Disp: 12 capsule, Rfl: 0    Multiple Vitamins-Minerals (MULTI-VITAMIN/MINERALS PO), Take 1 tablet by mouth daily (Patient not taking: Reported on 6/22/2021), Disp: , Rfl:     naproxen sodium (ALEVE) 220 MG tablet, Take by mouth (Patient not taking: Reported on 6/22/2021), Disp: , Rfl:     Omega-3 Fatty Acids (FISH OIL) 1,000 mg, Take by mouth (Patient not taking: Reported on 6/22/2021), Disp: , Rfl:       Review of Systems:  Have you recently had or currently have any of the following? If YES, what are you doing for the problem? · Fever, chills or unintended weight loss: No  · Sudden loss or change in your vision: No  · Nausea, vomiting or blood in your stool: No  · Painful or swollen joints: No  · Wheezing or cough: No  · Changing mole or non-healing wound: No  · Nosebleeds: No  · Excessive sweating: No  · Easy or prolonged bleeding? No  · Over the last 2 weeks, how often have you been bothered by the following problems? · Taking little interest or pleasure in doing things: 1 - Not at All  · Feeling down, depressed, or hopeless: 1 - Not at All  · Rapid heartbeat with epinephrine:  No    · FEMALES ONLY:    · Are you pregnant or planning to become pregnant? No  · Are you currently or planning to be nursing or breast feeding? No    · Any known allergies? · No Known Allergies      Physical Exam:     Was a chaperone (Derm Clinical Assistant) present throughout the entire Physical Exam? Yes     Did the Dermatology Team specifically  the patient on the importance of a Full Skin Exam to be sure that nothing is missed clinically?  Yes  o Did the patient ultimately request or accept a Full Skin Exam?  NO      CONSTITUTIONAL:   There were no vitals filed for this visit  PSYCH: Normal mood and affect  EYES: Normal conjunctiva  ENT: Normal lips and oral mucosa  CARDIOVASCULAR: No edema  RESPIRATORY: Normal respirations  HEME/LYMPH/IMMUNO:  No regional lymphadenopathy except as noted below in "ASSESSMENT AND PLAN BY DIAGNOSIS"    SKIN:  FULL ORGAN SYSTEM EXAM   Hair, Scalp, Ears, Face Normal except as noted below in Assessment   Neck, Cervical Chain Nodes Normal except as noted below in Assessment   Right Arm/Hand/Fingers Normal except as noted below in Assessment   Left Arm/Hand/Fingers Normal except as noted below in Assessment   Chest/Breasts/Axillae Viewed areas Normal except as noted below in Assessment   Abdomen, Umbilicus Normal except as noted below in Assessment   Back/Spine Normal except as noted below in Assessment   Groin/Genitalia/Buttocks NOT EXAMINED   Right Leg, Foot, Toes Normal except as noted below in Assessment   Left Leg, Foot, Toes Normal except as noted below in Assessment        Assessment and Plan by Diagnosis:    History of Present Condition:     Duration:  How long has this been an issue for you?    o  years   Location Affected:  Where on the body is this affecting you?    o  back    Quality:  Is there any bleeding, pain, itch, burning/irritation, or redness associated with the skin lesion? o  changes in size, bleeding, redness   Severity:  Describe any bleeding, pain, itch, burning/irritation, or redness on a scale of 1 to 10 (with 10 being the worst)  o     Timing:  Does this condition seem to be there pretty constantly or do you notice it more at specific times throughout the day? o  constant    Context:  Have you ever noticed that this condition seems to be associated with specific activities you do?    o  unknown    Modifying Factors:    o Anything that seems to make the condition worse?    -  unknown   o What have you tried to do to make the condition better? -  denies   Associated Signs and Symptoms:  Does this skin lesion seem to be associated with any of the following:  o  SL AMB DERM SIGNS AND SYMPTOMS: Redness and Bleeding         NEOPLASM OF UNCERTAIN BEHAVIOR OF SKIN    Physical Exam:   (Anatomic Location); (Size and Morphological Description); (Differential Diagnosis):     Medial Thoracic Back 1 1 cm red sangita plaque Diff Dx; Hemangioma    Pertinent Positives:   Pertinent Negatives: Additional History of Present Condition:      Assessment and Plan:   I have discussed with the patient that a sample of skin via a "skin biopsy would be potentially helpful to further make a specific diagnosis under the microscope   Based on a thorough discussion of this condition and the management approach to it (including a comprehensive discussion of the known risks, side effects and potential benefits of treatment), the patient (family) agrees to implement the following specific plan:    o Procedure:  Skin Biopsy  After a thorough discussion of treatment options and risk/benefits/alternatives (including but not limited to local pain, scarring, dyspigmentation, blistering, possible superinfection, and inability to confirm a diagnosis via histopathology), verbal and written consent were obtained and portion of the rash was biopsied for tissue sample  See below for consent that was obtained from patient and subsequent Procedure Note  PROCEDURE SHAVE BIOPSY NOTE:     Performing Physician: Dr Holden   Anatomic Location; Clinical Description with size (cm); Pre-Op Diagnosis:      Medial Thoracic Back 1 1 cm red sangita plaque Diff Dx; Hemangioma    Post-op diagnosis: Same      Local anesthesia: 1% xylocaine with epi       Topical anesthesia: None     Hemostasis: Aluminum chloride       After obtaining informed consent  at which time there was a discussion about the purpose of biopsy  and low risks of infection and bleeding    The area was prepped and draped in the usual fashion  Anesthesia was obtained with 1% lidocaine with epinephrine  A shave biopsy to an appropriate sampling depth was obtained with a sterile blade (such as a 15-blade or DermaBlade)  The resulting wound was covered with surgical ointment and bandaged appropriately  The patient tolerated the procedure well without complications and was without signs of functional compromise  Specimen has been sent for review by Dermatopathology  Standard post-procedure care has been explained and has been included in written form within the patient's copy of Informed Consent  INFORMED CONSENT DISCUSSION AND POST-OPERATIVE INSTRUCTIONS FOR PATIENT    I   RATIONALE FOR PROCEDURE  I understand that a skin biopsy allows the Dermatologist to test a lesion or rash under the microscope to obtain a diagnosis  It usually involves numbing the area with numbing medication and removing a small piece of skin; sometimes the area will be closed with sutures  In this specific procedure, sutures are not usually needed  If any sutures are placed, then they are usually need to be removed in 2 weeks or less  I understand that my Dermatologist recommends that a skin "shave" biopsy be performed today  A local anesthetic, similar to the kind that a dentist uses when filling a cavity, will be injected with a very small needle into the skin area to be sampled  The injected skin and tissue underneath "will go to sleep and become numb so no pain should be felt afterwards  An instrument shaped like a tiny "razor blade" (shave biopsy instrument) will be used to cut a small piece of tissue and skin from the area so that a sample of tissue can be taken and examined more closely under the microscope  A slight amount of bleeding will occur, but it will be stopped with direct pressure and a pressure bandage and any other appropriate methods  I understands that a scar will form where the wound was created    Surgical ointment will be applied to help protect the wound  Sutures are not usually needed  II   RISKS AND POTENTIAL COMPLICATIONS   I understand the risks and potential complications of a skin biopsy include but are not limited to the following:   Bleeding   Infection   Pain   Scar/keloid   Skin discoloration   Incomplete Removal   Recurrence   Nerve Damage/Numbness/Loss of Function   Allergic Reaction to Anesthesia   Biopsies are diagnostic procedures and based on findings additional treatment or evaluation may be required   Loss or destruction of specimen resulting in no additional findings    My Dermatologist has explained to me the nature of the condition, the nature of the procedure, and the benefits to be reasonably expected compared with alternative approaches  My Dermatologist has discussed the likelihood of major risks or complications of this procedure including the specific risks listed above, such as bleeding, infection, and scarring/keloid  I understand that a scar is expected after this procedure  I understand that my physician cannot predict if the scar will form a "keloid," which extends beyond the borders of the wound that is created  A keloid is a thick, painful, and bumpy scar  A keloid can be difficult to treat, as it does not always respond well to therapy, which includes injecting cortisone directly into the keloid every few weeks  While this usually reduces the pain and size of the scar, it does not eliminate it  I understand that photographs may be taken before and after the procedure  These will be maintained as part of the medical providers confidential records and may not be made available to me  I further authorize the medical provider to use the photographs for teaching purposes or to illustrate scientific papers, books, or lectures if in his/her judgment, medical research, education, or science may benefit from its use      I have had an opportunity to fully inquire about the risks and benefits of this procedure and its alternatives  I have been given ample time and opportunity to ask questions and to seek a second opinion if I wished to do so  I acknowledge that there have specifically been no guarantees as to the cosmetic results from the procedure  I am aware that with any procedure there is always the possibility of an unexpected complication  III  POST-PROCEDURAL CARE (WHAT YOU WILL NEED TO DO "AFTER THE BIOPSY" TO OPTIMIZE HEALING)     Keep the area clean and dry  Try NOT to remove the bandage or get it wet for the first 24 hours   Gently clean the area and apply surgical ointment (such as Vaseline petrolatum ointment, which is available "over the counter" and not a prescription) to the biopsy site for up to 2 weeks straight  This acts to protect the wound from the outside world   Sutures are not usually placed in this procedure  If any sutures were placed, return for suture removal as instructed (generally 1 week for the face, 2 weeks for the body)   Take Acetaminophen (Tylenol) for discomfort, if no contraindications  Ibuprofen or aspirin could make bleeding worse   Call our office immediately for signs of infection: fever, chills, increased redness, warmth, tenderness, discomfort/pain, or pus or foul smell coming from the wound  WHAT TO DO IF THERE IS ANY BLEEDING? If a small amount of bleeding is noticed, place a clean cloth over the area and apply firm pressure for ten minutes  Check the wound after 10 minutes of direct pressure  If bleeding persists, try one more time for an additional 10 minutes of direct pressure on the area  If the bleeding becomes heavier or does not stop after the second attempt, or if you have any other questions about this procedure, then please call your [facility]atologist by calling [phone] (SKIN)       I hereby acknowledge that I have reviewed and verified the site with my Dermatologist and have requested and authorized my Dermatologist to proceed with the procedure          Scribe Attestation    I,:  Christal Vargas MA am acting as a scribe while in the presence of the attending physician :       I,:  Rupali Richey MD personally performed the services described in this documentation    as scribed in my presence :

## 2022-01-25 ENCOUNTER — HOSPITAL ENCOUNTER (OUTPATIENT)
Dept: RADIOLOGY | Facility: HOSPITAL | Age: 52
Discharge: HOME | End: 2022-01-25
Payer: COMMERCIAL

## 2022-01-25 DIAGNOSIS — M79.672 LEFT FOOT PAIN: ICD-10-CM

## 2022-01-25 DIAGNOSIS — M79.671 RIGHT FOOT PAIN: ICD-10-CM

## 2022-01-25 PROCEDURE — 73630 X-RAY EXAM OF FOOT: CPT

## 2022-02-14 ENCOUNTER — RA CDI HCC (OUTPATIENT)
Dept: OTHER | Facility: HOSPITAL | Age: 52
End: 2022-02-14

## 2022-02-14 NOTE — PROGRESS NOTES
[facility]  coding opportunities       Chart reviewed, no opportunity found: CHART REVIEWED, NO OPPORTUNITY FOUND             Patients insurance company: [employer] (Medicare Advantage and Commercial)

## 2022-02-17 ENCOUNTER — OFFICE VISIT (OUTPATIENT)
Dept: FAMILY MEDICINE CLINIC | Facility: CLINIC | Age: 52
End: 2022-02-17
Payer: COMMERCIAL

## 2022-02-17 VITALS
HEIGHT: 63 IN | DIASTOLIC BLOOD PRESSURE: 78 MMHG | OXYGEN SATURATION: 99 % | HEART RATE: 85 BPM | SYSTOLIC BLOOD PRESSURE: 120 MMHG | TEMPERATURE: 97.7 F | BODY MASS INDEX: 38.84 KG/M2 | RESPIRATION RATE: 20 BRPM | WEIGHT: 219.2 LBS

## 2022-02-17 DIAGNOSIS — R53.83 OTHER FATIGUE: ICD-10-CM

## 2022-02-17 DIAGNOSIS — Z12.31 ENCOUNTER FOR SCREENING MAMMOGRAM FOR MALIGNANT NEOPLASM OF BREAST: ICD-10-CM

## 2022-02-17 DIAGNOSIS — E55.9 VITAMIN D DEFICIENCY: ICD-10-CM

## 2022-02-17 DIAGNOSIS — Z11.59 ENCOUNTER FOR HEPATITIS C SCREENING TEST FOR LOW RISK PATIENT: ICD-10-CM

## 2022-02-17 DIAGNOSIS — M54.6 CHRONIC BILATERAL THORACIC BACK PAIN: Primary | ICD-10-CM

## 2022-02-17 DIAGNOSIS — E78.2 MIXED HYPERLIPIDEMIA: ICD-10-CM

## 2022-02-17 DIAGNOSIS — R00.2 PALPITATIONS: ICD-10-CM

## 2022-02-17 DIAGNOSIS — G89.29 CHRONIC BILATERAL THORACIC BACK PAIN: Primary | ICD-10-CM

## 2022-02-17 DIAGNOSIS — R73.9 HYPERGLYCEMIA: ICD-10-CM

## 2022-02-17 PROCEDURE — 1036F TOBACCO NON-USER: CPT | Performed by: NURSE PRACTITIONER

## 2022-02-17 PROCEDURE — 99204 OFFICE O/P NEW MOD 45 MIN: CPT | Performed by: NURSE PRACTITIONER

## 2022-02-17 PROCEDURE — 93000 ELECTROCARDIOGRAM COMPLETE: CPT | Performed by: NURSE PRACTITIONER

## 2022-02-17 PROCEDURE — 3008F BODY MASS INDEX DOCD: CPT | Performed by: NURSE PRACTITIONER

## 2022-02-17 RX ORDER — NAPROXEN 500 MG/1
500 TABLET ORAL 2 TIMES DAILY WITH MEALS
Qty: 60 TABLET | Refills: 0 | Status: SHIPPED | OUTPATIENT
Start: 2022-02-17

## 2022-02-17 RX ORDER — OMEPRAZOLE 20 MG/1
20 CAPSULE, DELAYED RELEASE ORAL DAILY
COMMUNITY
Start: 2022-01-25

## 2022-02-17 RX ORDER — MELOXICAM 15 MG/1
15 TABLET ORAL DAILY
COMMUNITY
Start: 2022-01-25 | End: 2022-02-17

## 2022-02-17 NOTE — ASSESSMENT & PLAN NOTE
-pt reports thoracic back pain for many years and worsening throughout  On exam patient was tender to touch on upper thoracic spine area  Since this is ongoing for longer than a month I am ordering imaging on patient  I recommended taking NSAIDs as needed for back pain and use of Omeprazole together to better tolerate  I am also ordering Vitamin D on patient  Will f/u once done

## 2022-02-17 NOTE — ASSESSMENT & PLAN NOTE
-pt reports at times getting palpitations  Unsure if it is because she is working night shift and drinks a lot of drinks to keep her awake or not  I am ordering POCT EKG  In office EKG with NSR 72bpm no significant changes  TSH and CBC on patient

## 2022-02-17 NOTE — PROGRESS NOTES
BMI Counseling: Body mass index is 38 83 kg/m²  The BMI is above normal  Nutrition recommendations include encouraging healthy choices of fruits and vegetables, decreasing fast food intake, consuming healthier snacks, limiting drinks that contain sugar, increasing intake of lean protein, reducing intake of saturated and trans fat and reducing intake of cholesterol  Exercise recommendations include exercising 3-5 times per week  Rationale for BMI follow-up plan is due to patient being overweight or obese  Depression Screening and Follow-up Plan: Clincally patient does not have depression  No treatment is required  Assessment/Plan:    Chronic bilateral thoracic back pain  -pt reports thoracic back pain for many years and worsening throughout  On exam patient was tender to touch on upper thoracic spine area  Since this is ongoing for longer than a month I am ordering imaging on patient  I recommended taking NSAIDs as needed for back pain and use of Omeprazole together to better tolerate  I am also ordering Vitamin D on patient  Will f/u once done  Palpitations  -pt reports at times getting palpitations  Unsure if it is because she is working night shift and drinks a lot of drinks to keep her awake or not  I am ordering POCT EKG  In office EKG with NSR 72bpm no significant changes  TSH and CBC on patient  Diagnoses and all orders for this visit:    Chronic bilateral thoracic back pain  -     XR spine thoracic 3 vw; Future  -     naproxen (NAPROSYN) 500 mg tablet; Take 1 tablet (500 mg total) by mouth 2 (two) times a day with meals    Palpitations  -     POCT ECG  -     Holter monitor; Future    Mixed hyperlipidemia  -     Lipid panel; Future    Hyperglycemia  -     Comprehensive metabolic panel; Future  -     Hemoglobin A1C; Future    Other fatigue  -     CBC and differential; Future  -     TSH, 3rd generation with Free T4 reflex;  Future    Vitamin D deficiency  -     Vitamin D 25 hydroxy; Future    Encounter for hepatitis C screening test for low risk patient  -     Hepatitis C antibody; Future    Encounter for screening mammogram for malignant neoplasm of breast  -     Mammo screening bilateral w 3d & cad; Future    Other orders  -     Discontinue: meloxicam (MOBIC) 15 mg tablet; Take 15 mg by mouth daily  -     omeprazole (PriLOSEC) 20 mg delayed release capsule; Take 20 mg by mouth daily          Subjective:      Patient ID: Josiah Wilder is a 46 y o  female  46year old female patient here to establish care  Was receiving care in [facility]  PMHx: denies any significant  Back Pain  This is a recurrent problem  The current episode started more than 1 year ago  The problem occurs constantly  The problem has been gradually worsening since onset  The pain is present in the thoracic spine  The quality of the pain is described as aching  The pain does not radiate  The pain is at a severity of 8/10  The pain is moderate  The symptoms are aggravated by standing  Stiffness is present all day  Pertinent negatives include no bladder incontinence, bowel incontinence, chest pain, fever, headaches, paresis, tingling or weakness  Risk factors include obesity, poor posture and sedentary lifestyle  She has tried analgesics (tylenol arthritis) for the symptoms  The treatment provided moderate relief  Palpitations  This is a recurrent problem  The current episode started more than 1 year ago  The problem occurs intermittently  The problem has been waxing and waning  Associated symptoms include arthralgias and myalgias  Pertinent negatives include no chest pain, congestion, coughing, fever, headaches, nausea, neck pain, rash, sore throat, vertigo, visual change or weakness  Nothing aggravates the symptoms  She has tried nothing for the symptoms  The treatment provided no relief         The following portions of the patient's history were reviewed and updated as appropriate: allergies, current medications, past family history, past medical history, past social history, past surgical history and problem list     Review of Systems   Constitutional: Negative  Negative for appetite change and fever  HENT: Negative  Negative for congestion and sore throat  Eyes: Negative  Respiratory: Negative  Negative for cough, chest tightness, shortness of breath and wheezing  Cardiovascular: Positive for palpitations  Negative for chest pain  Gastrointestinal: Negative  Negative for bowel incontinence and nausea  Endocrine: Negative  Genitourinary: Negative  Negative for bladder incontinence  Musculoskeletal: Positive for arthralgias, back pain and myalgias  Negative for neck pain  Skin: Negative  Negative for rash  Allergic/Immunologic: Negative  Neurological: Negative  Negative for dizziness, vertigo, tingling, weakness and headaches  Hematological: Negative  Psychiatric/Behavioral: Negative  Objective:      /78 (BP Location: Left arm, Patient Position: Sitting, Cuff Size: Standard)   Pulse 85   Temp 97 7 °F (36 5 °C) (Temporal)   Resp 20   Ht 5' 3" (1 6 m)   Wt 99 4 kg (219 lb 3 2 oz)   SpO2 99%   BMI 38 83 kg/m²          Physical Exam  Vitals and nursing note reviewed  Constitutional:       General: She is not in acute distress  Appearance: Normal appearance  She is obese  She is not ill-appearing  HENT:      Head: Normocephalic and atraumatic  Right Ear: External ear normal       Left Ear: External ear normal       Nose: Nose normal  No congestion or rhinorrhea  Mouth/Throat:      Mouth: Mucous membranes are moist       Pharynx: Oropharynx is clear  No oropharyngeal exudate  Eyes:      Conjunctiva/sclera: Conjunctivae normal    Cardiovascular:      Rate and Rhythm: Normal rate and regular rhythm  Pulses: Normal pulses  Heart sounds: Normal heart sounds  Pulmonary:      Effort: Pulmonary effort is normal  No respiratory distress        Breath sounds: Normal breath sounds  Musculoskeletal:         General: Normal range of motion  Cervical back: Normal range of motion and neck supple  Skin:     General: Skin is warm and dry  Capillary Refill: Capillary refill takes less than 2 seconds  Neurological:      General: No focal deficit present  Mental Status: She is alert and oriented to person, place, and time     Psychiatric:         Mood and Affect: Mood normal          Behavior: Behavior normal

## 2022-02-18 ENCOUNTER — HOSPITAL ENCOUNTER (OUTPATIENT)
Dept: RADIOLOGY | Facility: HOSPITAL | Age: 52
Discharge: HOME | End: 2022-02-18
Payer: COMMERCIAL

## 2022-02-18 ENCOUNTER — APPOINTMENT (OUTPATIENT)
Dept: LAB | Facility: HOSPITAL | Age: 52
End: 2022-02-18
Payer: COMMERCIAL

## 2022-02-18 DIAGNOSIS — G89.29 CHRONIC BILATERAL THORACIC BACK PAIN: ICD-10-CM

## 2022-02-18 DIAGNOSIS — R53.83 OTHER FATIGUE: ICD-10-CM

## 2022-02-18 DIAGNOSIS — Z11.59 ENCOUNTER FOR HEPATITIS C SCREENING TEST FOR LOW RISK PATIENT: ICD-10-CM

## 2022-02-18 DIAGNOSIS — M54.6 CHRONIC BILATERAL THORACIC BACK PAIN: ICD-10-CM

## 2022-02-18 DIAGNOSIS — E78.2 MIXED HYPERLIPIDEMIA: ICD-10-CM

## 2022-02-18 DIAGNOSIS — E55.9 VITAMIN D DEFICIENCY: ICD-10-CM

## 2022-02-18 DIAGNOSIS — R73.9 HYPERGLYCEMIA: ICD-10-CM

## 2022-02-18 LAB
25(OH)D3 SERPL-MCNC: 33.5 NG/ML (ref 30–100)
ALBUMIN SERPL BCP-MCNC: 3.4 G/DL (ref 3.5–5)
ALP SERPL-CCNC: 79 U/L (ref 46–116)
ALT SERPL W P-5'-P-CCNC: 46 U/L (ref 12–78)
ANION GAP SERPL CALCULATED.3IONS-SCNC: 7 MMOL/L (ref 4–13)
AST SERPL W P-5'-P-CCNC: 20 U/L (ref 5–45)
BASOPHILS # BLD AUTO: 0.04 THOUSANDS/ΜL (ref 0–0.1)
BASOPHILS NFR BLD AUTO: 1 % (ref 0–1)
BILIRUB SERPL-MCNC: 0.53 MG/DL (ref 0.2–1)
BUN SERPL-MCNC: 17 MG/DL (ref 5–25)
CALCIUM ALBUM COR SERPL-MCNC: 9.5 MG/DL (ref 8.3–10.1)
CALCIUM SERPL-MCNC: 9 MG/DL (ref 8.3–10.1)
CHLORIDE SERPL-SCNC: 108 MMOL/L (ref 100–108)
CHOLEST SERPL-MCNC: 214 MG/DL
CO2 SERPL-SCNC: 31 MMOL/L (ref 21–32)
CREAT SERPL-MCNC: 0.93 MG/DL (ref 0.6–1.3)
EOSINOPHIL # BLD AUTO: 0.01 THOUSAND/ΜL (ref 0–0.61)
EOSINOPHIL NFR BLD AUTO: 0 % (ref 0–6)
ERYTHROCYTE [DISTWIDTH] IN BLOOD BY AUTOMATED COUNT: 12.2 % (ref 11.6–15.1)
EST. AVERAGE GLUCOSE BLD GHB EST-MCNC: 111 MG/DL
GFR SERPL CREATININE-BSD FRML MDRD: 70 ML/MIN/1.73SQ M
GLUCOSE P FAST SERPL-MCNC: 92 MG/DL (ref 65–99)
HBA1C MFR BLD: 5.5 %
HCT VFR BLD AUTO: 47 % (ref 34.8–46.1)
HCV AB SER QL: NORMAL
HDLC SERPL-MCNC: 58 MG/DL
HGB BLD-MCNC: 15.3 G/DL (ref 11.5–15.4)
IMM GRANULOCYTES # BLD AUTO: 0.02 THOUSAND/UL (ref 0–0.2)
IMM GRANULOCYTES NFR BLD AUTO: 0 % (ref 0–2)
LDLC SERPL CALC-MCNC: 131 MG/DL (ref 0–100)
LYMPHOCYTES # BLD AUTO: 1.35 THOUSANDS/ΜL (ref 0.6–4.47)
LYMPHOCYTES NFR BLD AUTO: 24 % (ref 14–44)
MCH RBC QN AUTO: 31.2 PG (ref 26.8–34.3)
MCHC RBC AUTO-ENTMCNC: 32.6 G/DL (ref 31.4–37.4)
MCV RBC AUTO: 96 FL (ref 82–98)
MONOCYTES # BLD AUTO: 0.39 THOUSAND/ΜL (ref 0.17–1.22)
MONOCYTES NFR BLD AUTO: 7 % (ref 4–12)
NEUTROPHILS # BLD AUTO: 3.92 THOUSANDS/ΜL (ref 1.85–7.62)
NEUTS SEG NFR BLD AUTO: 68 % (ref 43–75)
NONHDLC SERPL-MCNC: 156 MG/DL
NRBC BLD AUTO-RTO: 0 /100 WBCS
PLATELET # BLD AUTO: 179 THOUSANDS/UL (ref 149–390)
PMV BLD AUTO: 10.6 FL (ref 8.9–12.7)
POTASSIUM SERPL-SCNC: 4.2 MMOL/L (ref 3.5–5.3)
PROT SERPL-MCNC: 7.5 G/DL (ref 6.4–8.2)
RBC # BLD AUTO: 4.9 MILLION/UL (ref 3.81–5.12)
SODIUM SERPL-SCNC: 146 MMOL/L (ref 136–145)
TRIGL SERPL-MCNC: 126 MG/DL
TSH SERPL DL<=0.05 MIU/L-ACNC: 1.09 UIU/ML (ref 0.36–3.74)
WBC # BLD AUTO: 5.73 THOUSAND/UL (ref 4.31–10.16)

## 2022-02-18 PROCEDURE — 36415 COLL VENOUS BLD VENIPUNCTURE: CPT

## 2022-02-18 PROCEDURE — 72072 X-RAY EXAM THORAC SPINE 3VWS: CPT

## 2022-02-18 PROCEDURE — 80061 LIPID PANEL: CPT

## 2022-02-18 PROCEDURE — 82306 VITAMIN D 25 HYDROXY: CPT

## 2022-02-18 PROCEDURE — 83036 HEMOGLOBIN GLYCOSYLATED A1C: CPT

## 2022-02-18 PROCEDURE — 80053 COMPREHEN METABOLIC PANEL: CPT

## 2022-02-18 PROCEDURE — 86803 HEPATITIS C AB TEST: CPT

## 2022-02-18 PROCEDURE — 85025 COMPLETE CBC W/AUTO DIFF WBC: CPT

## 2022-02-18 PROCEDURE — 84443 ASSAY THYROID STIM HORMONE: CPT

## 2022-02-21 ENCOUNTER — TELEPHONE (OUTPATIENT)
Dept: FAMILY MEDICINE CLINIC | Facility: CLINIC | Age: 52
End: 2022-02-21

## 2022-02-21 NOTE — TELEPHONE ENCOUNTER
Call pt LVM need to call office back for results  ----- Message from Rachel Joseph, [address] sent at 2/21/2022 10:14 AM EST -----  Please call patient and inform that lab results are normal except cholesterol mildly elevated  Advise on diet modifications, exercise and following a low fat, no saturated or trans fat diet

## 2022-03-09 ENCOUNTER — RA CDI HCC (OUTPATIENT)
Dept: OTHER | Facility: HOSPITAL | Age: 52
End: 2022-03-09

## 2022-03-17 ENCOUNTER — OFFICE VISIT (OUTPATIENT)
Dept: FAMILY MEDICINE CLINIC | Facility: CLINIC | Age: 52
End: 2022-03-17
Payer: COMMERCIAL

## 2022-03-17 VITALS
OXYGEN SATURATION: 99 % | SYSTOLIC BLOOD PRESSURE: 128 MMHG | TEMPERATURE: 98.3 F | BODY MASS INDEX: 38.98 KG/M2 | WEIGHT: 220 LBS | DIASTOLIC BLOOD PRESSURE: 72 MMHG | HEART RATE: 83 BPM | RESPIRATION RATE: 18 BRPM | HEIGHT: 63 IN

## 2022-03-17 DIAGNOSIS — M25.50 ARTHRALGIA, UNSPECIFIED JOINT: ICD-10-CM

## 2022-03-17 DIAGNOSIS — Z00.00 ANNUAL PHYSICAL EXAM: Primary | ICD-10-CM

## 2022-03-17 PROCEDURE — 3725F SCREEN DEPRESSION PERFORMED: CPT | Performed by: NURSE PRACTITIONER

## 2022-03-17 PROCEDURE — 99396 PREV VISIT EST AGE 40-64: CPT | Performed by: NURSE PRACTITIONER

## 2022-03-17 PROCEDURE — 3008F BODY MASS INDEX DOCD: CPT | Performed by: NURSE PRACTITIONER

## 2022-03-17 PROCEDURE — 1036F TOBACCO NON-USER: CPT | Performed by: NURSE PRACTITIONER

## 2022-03-17 NOTE — PATIENT INSTRUCTIONS

## 2022-03-17 NOTE — PROGRESS NOTES
ADULT ANNUAL [address] [facility] PRIMARY CARE [facility]    NAME: Sierra Adler  AGE: 46 y o  SEX: female  : 1970     DATE: 3/17/2022     Assessment and Plan:     Problem List Items Addressed This Visit        Other    Annual physical exam - Primary     -Patient recommended healthy eating habits  Health risk assessment was discussed with patient also and the ways to stay healthier  Recommended a exercising frequently at least 5 days a week for 30 minutes at a time; such as joining a gym if not already enrolled or walking  Eating low-fat and low-cholesterol foods with avoidance of saturated fats or fried foods  Immunizations, and the need to comply with current CDC's recommendations were discussed  To follow up yearly for physical exams  Arthralgia    Relevant Orders    BLANCA Screen w/ Reflex to Titer/Pattern    Lyme Total Antibody Profile with reflex to WB    RF Screen w/ Reflex to Titer    Sjogren's Antibodies          Immunizations and preventive care screenings were discussed with patient today  Appropriate education was printed on patient's after visit summary  Counseling:  Alcohol/drug use: discussed moderation in alcohol intake, the recommendations for healthy alcohol use, and avoidance of illicit drug use  Dental Health: discussed importance of regular tooth brushing, flossing, and dental visits  Injury prevention: discussed safety/seat belts, safety helmets, smoke detectors, carbon dioxide detectors, and smoking near bedding or upholstery  Sexual health: discussed sexually transmitted diseases, partner selection, use of condoms, avoidance of unintended pregnancy, and contraceptive alternatives  · Exercise: the importance of regular exercise/physical activity was discussed  Recommend exercise 3-5 times per week for at least 30 minutes  BMI Counseling: Body mass index is 38 97 kg/m²   The BMI is above normal  Nutrition recommendations include encouraging healthy choices of fruits and vegetables, decreasing fast food intake, consuming healthier snacks, limiting drinks that contain sugar, increasing intake of lean protein, reducing intake of saturated and trans fat and reducing intake of cholesterol  Exercise recommendations include exercising 3-5 times per week  Rationale for BMI follow-up plan is due to patient being overweight or obese  Depression Screening and Follow-up Plan: Patient was screened for depression during today's encounter  They screened negative with a PHQ-2 score of 0  Return in about 1 year (around 3/17/2023) for Annual physical      Chief Complaint:     Chief Complaint   Patient presents with    Physical Exam      History of Present Illness:     Adult Annual Physical   Patient here for a comprehensive physical exam  The patient reports no problems  Diet and Physical Activity  · Diet/Nutrition: well balanced diet and consuming 3-5 servings of fruits/vegetables daily  · Exercise: no formal exercise  Depression Screening  PHQ-2/9 Depression Screening    Little interest or pleasure in doing things: 0 - not at all  Feeling down, depressed, or hopeless: 0 - not at all  PHQ-2 Score: 0  PHQ-2 Interpretation: Negative depression screen       General Health  · Sleep: gets 4-6 hours of sleep on average  · Hearing: normal - bilateral   · Vision: most recent eye exam >1 year ago  · Dental: regular dental visits  /GYN Health  · Patient is: postmenopausal  · Last menstrual period: irregular periods  · Contraceptive method: tubal ligation  Review of Systems:     Review of Systems   Constitutional: Negative  Negative for appetite change and fever  HENT: Negative  Eyes: Negative  Respiratory: Negative  Negative for cough, chest tightness, shortness of breath and wheezing  Cardiovascular: Negative  Negative for chest pain and palpitations  Gastrointestinal: Negative  Endocrine: Negative  Genitourinary: Negative  Musculoskeletal: Positive for arthralgias  Skin: Negative  Allergic/Immunologic: Negative  Neurological: Negative  Negative for dizziness and headaches  Hematological: Negative  Psychiatric/Behavioral: Negative  Past Medical History:     Past Medical History:   Diagnosis Date    Colon polyp     Depression with anxiety     last assessed-2016    Hx of varicose veins     Nabothian cyst     Osteoarthritis     TMJ arthralgia       Past Surgical History:     Past Surgical History:   Procedure Laterality Date     SECTION      COLONOSCOPY W/ POLYPECTOMY  2020    ENDOMETRIAL BIOPSY      without cervical dilation    KNEE ARTHROPLASTY      KNEE SURGERY      TUBAL LIGATION      last assessed-10/15/2014      Social History:     Social History     Socioeconomic History    Marital status:      Spouse name: None    Number of children: None    Years of education: None    Highest education level: None   Occupational History     Comment: full-time employment   Tobacco Use    Smoking status: Never Smoker    Smokeless tobacco: Never Used   Vaping Use    Vaping Use: Never used   Substance and Sexual Activity    Alcohol use:  Yes    Drug use: No    Sexual activity: Yes   Other Topics Concern    None   Social History Narrative    Coffee    Exercise frequency (times/week)     Social Determinants of Health     Financial Resource Strain: Not on file   Food Insecurity: Not on file   Transportation Needs: Not on file   Physical Activity: Not on file   Stress: Not on file   Social Connections: Not on file   Intimate Partner Violence: Not on file   Housing Stability: Not on file      Family History:     Family History   Problem Relation Age of Onset    Hypertension Mother     Osteoporosis Mother     Heart attack Father     Hypertension Sister     Diabetes type II Sister     Thyroid disease Sister     Hypertension Brother  Asthma Daughter     No Known Problems Maternal Grandmother     No Known Problems Maternal Grandfather     No Known Problems Paternal Grandmother     No Known Problems Paternal Grandfather     No Known Problems Sister     No Known Problems Maternal Aunt     Breast cancer Cousin 48    No Known Problems Son     No Known Problems Son     No Known Problems Paternal Aunt       Current Medications:     Current Outpatient Medications   Medication Sig Dispense Refill    aspirin 81 MG tablet Take by mouth       naproxen (NAPROSYN) 500 mg tablet Take 1 tablet (500 mg total) by mouth 2 (two) times a day with meals 60 tablet 0    omeprazole (PriLOSEC) 20 mg delayed release capsule Take 20 mg by mouth daily (Patient not taking: Reported on 3/17/2022 )       No current facility-administered medications for this visit  Allergies:     No Known Allergies   Physical Exam:     /72 (BP Location: Right arm, Patient Position: Sitting, Cuff Size: Standard)   Pulse 83   Temp 98 3 °F (36 8 °C) (Tympanic)   Resp 18   Ht 5' 3" (1 6 m)   Wt 99 8 kg (220 lb)   SpO2 99%   BMI 38 97 kg/m²     Physical Exam  Vitals and nursing note reviewed  Constitutional:       General: She is not in acute distress  Appearance: Normal appearance  She is obese  She is not ill-appearing  HENT:      Head: Normocephalic and atraumatic  Right Ear: Tympanic membrane, ear canal and external ear normal  There is no impacted cerumen  Left Ear: Tympanic membrane, ear canal and external ear normal  There is no impacted cerumen  Nose: Nose normal  No congestion or rhinorrhea  Mouth/Throat:      Mouth: Mucous membranes are moist       Pharynx: Oropharynx is clear  No oropharyngeal exudate or posterior oropharyngeal erythema  Eyes:      Conjunctiva/sclera: Conjunctivae normal       Pupils: Pupils are equal, round, and reactive to light  Cardiovascular:      Rate and Rhythm: Normal rate and regular rhythm  Pulses: Normal pulses  Heart sounds: Normal heart sounds  Pulmonary:      Effort: Pulmonary effort is normal       Breath sounds: Normal breath sounds  Abdominal:      General: Bowel sounds are normal       Palpations: Abdomen is soft  Musculoskeletal:         General: No tenderness or deformity  Normal range of motion  Cervical back: Normal range of motion and neck supple  Skin:     General: Skin is warm and dry  Capillary Refill: Capillary refill takes less than 2 seconds  Neurological:      General: No focal deficit present  Mental Status: She is alert and oriented to person, place, and time     Psychiatric:         Mood and Affect: Mood normal          Behavior: Behavior normal           Usman Adam, [address]

## 2022-08-11 ENCOUNTER — APPOINTMENT (OUTPATIENT)
Dept: LAB | Facility: HOSPITAL | Age: 52
End: 2022-08-11
Payer: COMMERCIAL

## 2022-08-11 DIAGNOSIS — Z45.02 ICD (IMPLANTABLE CARDIOVERTER-DEFIBRILLATOR) BATTERY DEPLETION: ICD-10-CM

## 2022-08-11 LAB
ALBUMIN SERPL BCP-MCNC: 3.3 G/DL (ref 3.5–5)
ALP SERPL-CCNC: 73 U/L (ref 46–116)
ALT SERPL W P-5'-P-CCNC: 31 U/L (ref 12–78)
ANION GAP SERPL CALCULATED.3IONS-SCNC: 9 MMOL/L (ref 4–13)
AST SERPL W P-5'-P-CCNC: 16 U/L (ref 5–45)
BASOPHILS # BLD AUTO: 0.04 THOUSANDS/ΜL (ref 0–0.1)
BASOPHILS NFR BLD AUTO: 1 % (ref 0–1)
BILIRUB SERPL-MCNC: 0.5 MG/DL (ref 0.2–1)
BUN SERPL-MCNC: 17 MG/DL (ref 5–25)
CALCIUM ALBUM COR SERPL-MCNC: 9.5 MG/DL (ref 8.3–10.1)
CALCIUM SERPL-MCNC: 8.9 MG/DL (ref 8.3–10.1)
CHLORIDE SERPL-SCNC: 109 MMOL/L (ref 96–108)
CO2 SERPL-SCNC: 26 MMOL/L (ref 21–32)
CREAT SERPL-MCNC: 0.88 MG/DL (ref 0.6–1.3)
EOSINOPHIL # BLD AUTO: 0.01 THOUSAND/ΜL (ref 0–0.61)
EOSINOPHIL NFR BLD AUTO: 0 % (ref 0–6)
ERYTHROCYTE [DISTWIDTH] IN BLOOD BY AUTOMATED COUNT: 12.3 % (ref 11.6–15.1)
GFR SERPL CREATININE-BSD FRML MDRD: 75 ML/MIN/1.73SQ M
GLUCOSE P FAST SERPL-MCNC: 98 MG/DL (ref 65–99)
HCT VFR BLD AUTO: 44.9 % (ref 34.8–46.1)
HGB BLD-MCNC: 14.4 G/DL (ref 11.5–15.4)
IMM GRANULOCYTES # BLD AUTO: 0.03 THOUSAND/UL (ref 0–0.2)
IMM GRANULOCYTES NFR BLD AUTO: 1 % (ref 0–2)
LYMPHOCYTES # BLD AUTO: 1.36 THOUSANDS/ΜL (ref 0.6–4.47)
LYMPHOCYTES NFR BLD AUTO: 26 % (ref 14–44)
MCH RBC QN AUTO: 30.4 PG (ref 26.8–34.3)
MCHC RBC AUTO-ENTMCNC: 32.1 G/DL (ref 31.4–37.4)
MCV RBC AUTO: 95 FL (ref 82–98)
MONOCYTES # BLD AUTO: 0.35 THOUSAND/ΜL (ref 0.17–1.22)
MONOCYTES NFR BLD AUTO: 7 % (ref 4–12)
NEUTROPHILS # BLD AUTO: 3.45 THOUSANDS/ΜL (ref 1.85–7.62)
NEUTS SEG NFR BLD AUTO: 65 % (ref 43–75)
NRBC BLD AUTO-RTO: 0 /100 WBCS
PLATELET # BLD AUTO: 164 THOUSANDS/UL (ref 149–390)
PMV BLD AUTO: 10.5 FL (ref 8.9–12.7)
POTASSIUM SERPL-SCNC: 4.1 MMOL/L (ref 3.5–5.3)
PROT SERPL-MCNC: 7.4 G/DL (ref 6.4–8.4)
RBC # BLD AUTO: 4.73 MILLION/UL (ref 3.81–5.12)
SODIUM SERPL-SCNC: 144 MMOL/L (ref 135–147)
WBC # BLD AUTO: 5.24 THOUSAND/UL (ref 4.31–10.16)

## 2022-08-11 PROCEDURE — 80053 COMPREHEN METABOLIC PANEL: CPT

## 2022-08-11 PROCEDURE — 36415 COLL VENOUS BLD VENIPUNCTURE: CPT

## 2022-08-11 PROCEDURE — 85025 COMPLETE CBC W/AUTO DIFF WBC: CPT

## 2022-08-12 ENCOUNTER — HOSPITAL ENCOUNTER (OUTPATIENT)
Dept: NON INVASIVE DIAGNOSTICS | Facility: HOSPITAL | Age: 52
Discharge: HOME | End: 2022-08-12
Payer: COMMERCIAL

## 2022-08-12 DIAGNOSIS — M79.605 PAIN IN LEFT LEG: ICD-10-CM

## 2022-08-12 DIAGNOSIS — R60.0 LOCALIZED EDEMA: ICD-10-CM

## 2022-08-12 DIAGNOSIS — I87.2 VENOUS INSUFFICIENCY (CHRONIC) (PERIPHERAL): ICD-10-CM

## 2022-08-12 PROCEDURE — 93971 EXTREMITY STUDY: CPT

## 2022-08-12 PROCEDURE — 93971 EXTREMITY STUDY: CPT | Performed by: SURGERY

## 2022-09-23 ENCOUNTER — RA CDI HCC (OUTPATIENT)
Dept: OTHER | Facility: HOSPITAL | Age: 52
End: 2022-09-23

## 2022-09-23 NOTE — PROGRESS NOTES
[facility]  coding opportunities       Chart reviewed, no opportunity found: CHART REVIEWED, NO OPPORTUNITY FOUND        Patients Insurance        Commercial Insurance: [address] [place] [address]

## 2022-10-03 ENCOUNTER — OFFICE VISIT (OUTPATIENT)
Dept: FAMILY MEDICINE CLINIC | Facility: CLINIC | Age: 52
End: 2022-10-03
Payer: COMMERCIAL

## 2022-10-03 VITALS
WEIGHT: 217 LBS | SYSTOLIC BLOOD PRESSURE: 126 MMHG | TEMPERATURE: 98.4 F | HEART RATE: 84 BPM | DIASTOLIC BLOOD PRESSURE: 78 MMHG | BODY MASS INDEX: 38.45 KG/M2 | OXYGEN SATURATION: 99 % | HEIGHT: 63 IN

## 2022-10-03 DIAGNOSIS — R25.2 LEG CRAMPS: ICD-10-CM

## 2022-10-03 DIAGNOSIS — M21.079 ACQUIRED EVERSION OF FOOT, UNSPECIFIED LATERALITY: ICD-10-CM

## 2022-10-03 DIAGNOSIS — M21.42 FLAT FEET, BILATERAL: ICD-10-CM

## 2022-10-03 DIAGNOSIS — R60.0 PEDAL EDEMA: ICD-10-CM

## 2022-10-03 DIAGNOSIS — Z00.00 HEALTH CARE MAINTENANCE: Primary | ICD-10-CM

## 2022-10-03 DIAGNOSIS — M21.41 FLAT FEET, BILATERAL: ICD-10-CM

## 2022-10-03 PROCEDURE — 99386 PREV VISIT NEW AGE 40-64: CPT | Performed by: FAMILY MEDICINE

## 2022-10-03 NOTE — PROGRESS NOTES
Diabetic Foot Exam    Diabetic Foot ExamName: Ofe Khan      : 1970      MRN: [id number]  Encounter Provider: Tanesha Patel DO  Encounter Date: 10/3/2022   Encounter department: [facility]    Assessment & Plan     Needs orthotics  1  Health care maintenance    2  Leg cramps    3  Pedal edema    4  Acquired eversion of foot, unspecified laterality    5  Flat feet, bilateral        Depression Screening and Follow-up Plan: Patient was screened for depression during today's encounter  They screened negative with a PHQ-2 score of 0  Subjective     Chief Complaint   Patient presents with    New Patient Visit    Establish Care    Physical Exam    Annual Exam    Leg Swelling    leg cramp     Left side x 10 years/hoarse voice sound x 1 years  BMI Counseling: Body mass index is 38 44 kg/m²  The BMI is above normal  Nutrition recommendations include reducing portion sizes  PHQ-2/9 Depression Screening    Little interest or pleasure in doing things: 0 - not at all  Feeling down, depressed, or hopeless: 0 - not at all  PHQ-2 Score: 0  PHQ-2 Interpretation: Negative depression screen           Physical   SH: neg tob or OH  Last menses was 2022  Stands all day at work  Feet, ankles swell by end of day  Can get palpations sometimes, cramps in legs HS  Reviewed prior labs  Review of Systems   Constitutional: Negative  HENT: Negative  Eyes: Negative  Respiratory: Negative  Cardiovascular: Negative  Gastrointestinal: Negative  Genitourinary: Negative  Musculoskeletal: Negative  Skin: Negative  Neurological: Negative  Psychiatric/Behavioral: Negative          Past Medical History:   Diagnosis Date    Colon polyp     Depression with anxiety     last assessed-2016    Hx of varicose veins     Nabothian cyst     Osteoarthritis     TMJ arthralgia      Past Surgical History:   Procedure Laterality Date     SECTION  COLONOSCOPY W/ POLYPECTOMY  07/2020    ENDOMETRIAL BIOPSY      without cervical dilation    KNEE ARTHROPLASTY      KNEE SURGERY      TUBAL LIGATION      last assessed-10/15/2014     Family History   Problem Relation Age of Onset    Hypertension Mother     Osteoporosis Mother     Heart attack Father     Hypertension Sister     Diabetes type II Sister     Thyroid disease Sister     Hypertension Brother     Asthma Daughter     No Known Problems Maternal Grandmother     No Known Problems Maternal Grandfather     No Known Problems Paternal Grandmother     No Known Problems Paternal Grandfather     No Known Problems Sister     No Known Problems Maternal Aunt     Breast cancer Cousin 48    No Known Problems Son     No Known Problems Son     No Known Problems Paternal Aunt      Social History     Socioeconomic History    Marital status:      Spouse name: None    Number of children: None    Years of education: None    Highest education level: None   Occupational History     Comment: full-time employment   Tobacco Use    Smoking status: Never Smoker    Smokeless tobacco: Never Used   Vaping Use    Vaping Use: Never used   Substance and Sexual Activity    Alcohol use:  Yes    Drug use: No    Sexual activity: Yes   Other Topics Concern    None   Social History Narrative    Coffee    Exercise frequency (times/week)     Social Determinants of Health     Financial Resource Strain: Not on file   Food Insecurity: Not on file   Transportation Needs: Not on file   Physical Activity: Not on file   Stress: Not on file   Social Connections: Not on file   Intimate Partner Violence: Not on file   Housing Stability: Not on file     Current Outpatient Medications on File Prior to Visit   Medication Sig    aspirin 81 MG tablet Take 81 mg by mouth if needed    naproxen (NAPROSYN) 500 mg tablet Take 1 tablet (500 mg total) by mouth 2 (two) times a day with meals    omeprazole (PriLOSEC) 20 mg delayed release capsule Take 20 mg by mouth daily (Patient not taking: Reported on 3/17/2022 )     No Known Allergies  Immunization History   Administered Date(s) Administered    COVID-19 MODERNA VACC 0 5 ML IM 03/17/2021, 04/15/2021, 11/04/2021    H1N1, All Formulations 01/19/2010    INFLUENZA 09/28/2009, 09/21/2010, 09/22/2011, 02/13/2013, 11/01/2016    Influenza Quadrivalent Preservative Free 3 years and older IM 11/01/2016    Tdap 05/05/2008, 01/27/2016       Objective     /78 (BP Location: Left arm, Patient Position: Sitting, Cuff Size: Large)   Pulse 84   Temp 98 4 °F (36 9 °C) (Temporal)   Ht 5' 3" (1 6 m)   Wt 98 4 kg (217 lb)   SpO2 99%   BMI 38 44 kg/m²     Physical Exam  Constitutional:       Appearance: She is well-developed  HENT:      Head: Normocephalic and atraumatic  Right Ear: Ear canal and external ear normal       Left Ear: Ear canal and external ear normal       Nose: Nose normal       Mouth/Throat:      Mouth: Mucous membranes are moist       Pharynx: Oropharynx is clear  Eyes:      Conjunctiva/sclera: Conjunctivae normal       Pupils: Pupils are equal, round, and reactive to light  Cardiovascular:      Rate and Rhythm: Normal rate and regular rhythm  Pulses: Normal pulses  Heart sounds: Normal heart sounds  Pulmonary:      Effort: Pulmonary effort is normal       Breath sounds: Normal breath sounds  Abdominal:      General: Abdomen is flat  Bowel sounds are normal       Palpations: Abdomen is soft  Musculoskeletal:      Cervical back: Normal range of motion and neck supple  Comments: Stand: BL flat feet, mild left foot eversion  Skin:     General: Skin is warm and dry  Neurological:      General: No focal deficit present  Mental Status: She is alert and oriented to person, place, and time  Deep Tendon Reflexes: Reflexes are normal and symmetric     Psychiatric:         Mood and Affect: Mood normal          Behavior: Behavior normal  Thought Content:  Thought content normal          Judgment: Judgment normal        Li Licea, DO

## 2022-10-20 NOTE — PROGRESS NOTES
Assessment/Plan:    No problem-specific Assessment & Plan notes found for this encounter  {Assess/PlanSmartLinks:42356}      Subjective:      Patient ID: Ivette Irizarry is a 45 y o  female  HPI    {Common ambulatory SmartLinks:17511}    Review of Systems   Constitutional: Negative  HENT: Negative  Eyes: Negative  Respiratory: Negative  Cardiovascular: Negative  Gastrointestinal: Negative  Endocrine: Negative  Genitourinary: Negative  Musculoskeletal: Negative  Skin: Negative  Allergic/Immunologic: Negative  Neurological: Negative  Hematological: Negative  Psychiatric/Behavioral: Negative  Objective: There were no vitals taken for this visit           Physical Exam

## 2022-10-24 ENCOUNTER — OFFICE VISIT (OUTPATIENT)
Dept: VASCULAR SURGERY | Facility: CLINIC | Age: 52
End: 2022-10-24
Payer: COMMERCIAL

## 2022-10-24 VITALS
SYSTOLIC BLOOD PRESSURE: 112 MMHG | HEIGHT: 63 IN | DIASTOLIC BLOOD PRESSURE: 78 MMHG | WEIGHT: 218 LBS | HEART RATE: 68 BPM | BODY MASS INDEX: 38.62 KG/M2

## 2022-10-24 DIAGNOSIS — I87.2 VENOUS INSUFFICIENCY (CHRONIC) (PERIPHERAL): ICD-10-CM

## 2022-10-24 DIAGNOSIS — R60.0 LOCALIZED EDEMA: ICD-10-CM

## 2022-10-24 DIAGNOSIS — I83.893 SYMPTOMATIC VARICOSE VEINS OF BOTH LOWER EXTREMITIES: Primary | ICD-10-CM

## 2022-10-24 DIAGNOSIS — M79.605 PAIN IN LEFT LEG: ICD-10-CM

## 2022-10-24 DIAGNOSIS — I87.2 VENOUS INSUFFICIENCY: ICD-10-CM

## 2022-10-24 PROCEDURE — 99244 OFF/OP CNSLTJ NEW/EST MOD 40: CPT | Performed by: SURGERY

## 2022-10-24 NOTE — PROGRESS NOTES
Assessment/Plan:    Symptomatic varicose veins of both lower extremities  This is a 67-year-old female with symptomatic bilateral varicose veins found to have on a reflux study back into 17 significant reflux within the left great saphenous vein throughout its course and the right great saphenous vein below the knee  No deep venous reflux was noted that time  Given recommended EVLT however she had held off on surgery due to concerns with complications  We discussed the pathophysiology of venous insufficiency as well as the risk factors and factor modifications  She is continued to have the pain lower extremities despite wearing compression socks while working  I recommended she starts wearing her compression socks throughout the day eating well she is not working and can take them off at night  I recommended leg elevation while resting aerobic exercise activity as well as good skin care with moisturizers and creams  I over-the-counter ibuprofen pain control  I believe she would be a good candidate for venous ablation but she is still hesitant about surgery due to potential complications  I recommended she continue with her medical therapy and to increase the use of her compression stockings  We will have her return in about 3 months to see how she is doing and will have a reflux study done at that time for a more updated ultrasound       Diagnoses and all orders for this visit:    Symptomatic varicose veins of both lower extremities    Pain in left leg  -     Ambulatory Referral to Vascular Surgery    Localized edema  -     Ambulatory Referral to Vascular Surgery    Venous insufficiency (chronic) (peripheral)  -     Ambulatory Referral to Vascular Surgery  -     Compression Stocking  -     VAS reflux lower limb venous duplex study with reflux assessment, complete bilateral; Future    Venous insufficiency        Subjective:      Patient ID: Faiza Bustos is a 45 y o  female      Pt is new and presents today to rev LEV done on 8/12/22 for BLE edema- pain and swelling L>R  Pt wears compression during work  Pt denies h/o DVT or previous treatment  HPI     This is a 61-year-old female presenting back to the office for her bilateral lower extremity edema as well as symptomatic varicose veins of the bilateral lower extremities  Patient states that she is unfortunately with this for years had seen us back in 2017 hold surgery due to concerns potential complications that could occur  Patient states she does wear compression socks while she is working  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  Her pain is worse at night as well as her edema  She does have some tenderness over lower extremities noticed any skin changes or wound  She tries to keep her legs elevated at night as well as continue with good skin care with moisturizers and creams  Of note on her last visit in 2017 she was found to have on her reflux study significant reflux of the greater saphenous vein from the calf to the thigh as well as a right great saphenous vein incompetence below-the-knee  No deep vein incompetence was noted at that time    The following portions of the patient's history were reviewed and updated as appropriate: allergies, current medications, past family history, past medical history, past social history, past surgical history and problem list     I have spent 25 minutes with Patient  today in which greater than 50% of this time was spent in counseling/coordination of care regarding Diagnostic results, Prognosis, Risks and benefits of tx options, Intructions for management, Patient and family education, Importance of tx compliance, Risk factor reductions and Impressions  Review of Systems   Constitutional: Negative  HENT: Negative  Eyes: Negative  Respiratory: Negative  Cardiovascular: Positive for leg swelling  Gastrointestinal: Negative  Endocrine: Negative  Genitourinary: Negative  Musculoskeletal: Negative  Skin: Negative  Allergic/Immunologic: Negative  Neurological: Negative  Hematological: Negative  Psychiatric/Behavioral: Negative  Objective:      /78 (BP Location: Left arm, Patient Position: Sitting, Cuff Size: Standard)   Pulse 68   Ht 5' 3" (1 6 m)   Wt 98 9 kg (218 lb)   BMI 38 62 kg/m²          Physical Exam  Constitutional:       Appearance: Normal appearance  HENT:      Head: Normocephalic  Mouth/Throat:      Mouth: Mucous membranes are moist       Pharynx: Oropharynx is clear  Eyes:      Extraocular Movements: Extraocular movements intact  Pupils: Pupils are equal, round, and reactive to light  Cardiovascular:      Rate and Rhythm: Normal rate and regular rhythm  Pulmonary:      Effort: Pulmonary effort is normal    Abdominal:      General: Abdomen is flat  Tenderness: There is no abdominal tenderness  Musculoskeletal:      Cervical back: Normal range of motion  Right lower leg: Edema present  Left lower leg: Edema present  Skin:     General: Skin is warm and dry  Comments: small visible varicose veins bilaterally, no wounds, no skin changes, tender to deep palpation of the lower legs   Neurological:      General: No focal deficit present  Mental Status: She is alert and oriented to person, place, and time     Psychiatric:         Mood and Affect: Mood normal          Behavior: Behavior normal

## 2022-10-24 NOTE — PATIENT INSTRUCTIONS
Leg Edema   AMBULATORY CARE:   Leg edema  is swelling caused by fluid buildup  Your legs may swell if you sit or stand for long periods of time, are pregnant, or are injured  Swelling may also occur if you have heart failure or circulation problems  This means that your heart does not pump blood through your body as it should  Call your local emergency number (911 in the [address]) for any of the following: You cannot walk  You have chest pain or trouble breathing that is worse when you lie down  You suddenly feel lightheaded and have trouble breathing  You have new and sudden chest pain  You may have more pain when you take deep breaths or cough  You cough up blood  Seek care immediately if:   You feel faint or confused  Your skin turns blue or gray  Your leg feels warm, tender, and painful  It may be swollen and red  Call your doctor if:   You have a fever or feel more tired than usual     The veins in your legs look larger than usual  They may look full or bulging  Your legs itch or feel heavy  You have red or white areas or sores on your legs  The skin may also appear dimpled or have indentations  You are gaining weight  You have trouble moving your ankles  The swelling does not go away, or other parts of your body swell  You have questions or concerns about your condition or care  Self-care:   Elevate your legs  Raise your legs above the level of your heart as often as you can  This will help decrease swelling and pain  Prop your legs on pillows or blankets to keep them elevated comfortably  Wear pressure stockings, if directed  These tight stockings put pressure on your legs to promote blood flow and prevent blood clots  Put them on before you get out of bed  Wear the stockings during the day  Do not wear them while you sleep  Stay active  Do not stand or sit for long periods of time   Ask your healthcare provider about the best exercise plan for you          Eat healthy foods  Healthy foods include fruits, vegetables, whole-grain breads, low-fat dairy products, beans, lean meats, and fish  Ask if you need to be on a special diet  Limit sodium (salt)  Salt will make your body hold even more fluid  Your healthcare provider will tell you how many milligrams (mg) of salt you can have each day  Follow up with your doctor as directed:  Write down your questions so you remember to ask them during your visits  © Copyright [employer] 2022 Information is for End User's use only and may not be sold, redistributed or otherwise used for commercial purposes  All illustrations and images included in CareNotes® are the copyrighted property of A D A M , Inc  or [facility]   The above information is an  only  It is not intended as medical advice for individual conditions or treatments  Talk to your doctor, nurse or pharmacist before following any medical regimen to see if it is safe and effective for you

## 2022-10-24 NOTE — ASSESSMENT & PLAN NOTE
This is a 78-year-old female with symptomatic bilateral varicose veins found to have on a reflux study back into 17 significant reflux within the left great saphenous vein throughout its course and the right great saphenous vein below the knee  No deep venous reflux was noted that time  Given recommended EVLT however she had held off on surgery due to concerns with complications  We discussed the pathophysiology of venous insufficiency as well as the risk factors and factor modifications  She is continued to have the pain lower extremities despite wearing compression socks while working  I recommended she starts wearing her compression socks throughout the day eating well she is not working and can take them off at night  I recommended leg elevation while resting aerobic exercise activity as well as good skin care with moisturizers and creams  I over-the-counter ibuprofen pain control  I believe she would be a good candidate for venous ablation but she is still hesitant about surgery due to potential complications  I recommended she continue with her medical therapy and to increase the use of her compression stockings    We will have her return in about 3 months to see how she is doing and will have a reflux study done at that time for a more updated ultrasound

## 2023-03-01 ENCOUNTER — VBI (OUTPATIENT)
Dept: ADMINISTRATIVE | Facility: OTHER | Age: 53
End: 2023-03-01

## 2023-05-16 ENCOUNTER — VBI (OUTPATIENT)
Dept: ADMINISTRATIVE | Facility: OTHER | Age: 53
End: 2023-05-16

## 2023-06-30 ENCOUNTER — HOSPITAL ENCOUNTER (OUTPATIENT)
Dept: MAMMOGRAPHY | Facility: MEDICAL CENTER | Age: 53
Discharge: HOME | End: 2023-06-30
Payer: COMMERCIAL

## 2023-06-30 VITALS — HEIGHT: 63 IN | WEIGHT: 218 LBS | BODY MASS INDEX: 38.62 KG/M2

## 2023-06-30 DIAGNOSIS — Z12.31 ENCOUNTER FOR SCREENING MAMMOGRAM FOR MALIGNANT NEOPLASM OF BREAST: ICD-10-CM

## 2023-06-30 PROCEDURE — 77063 BREAST TOMOSYNTHESIS BI: CPT

## 2023-06-30 PROCEDURE — 77067 SCR MAMMO BI INCL CAD: CPT

## 2023-07-11 ENCOUNTER — HOSPITAL ENCOUNTER (OUTPATIENT)
Dept: ULTRASOUND IMAGING | Facility: CLINIC | Age: 53
Discharge: HOME | End: 2023-07-11
Payer: COMMERCIAL

## 2023-07-11 DIAGNOSIS — R92.8 ABNORMAL MAMMOGRAM: ICD-10-CM

## 2023-07-11 PROCEDURE — 76642 ULTRASOUND BREAST LIMITED: CPT

## 2023-07-11 NOTE — PROGRESS NOTES
Met with patient and    regarding recommendation for;    ___xx__ RIGHT ______LEFT      __x___Ultrasound guided  ______Stereotactic breast and lymph node biopsy. __X___Verbalized understanding.       Blood thinners:  No: ____x_ Yes: ______ What:                 Biopsy teaching sheet given:  Yes: ___X___ No: ________    Pt given contact information and adv to call with any questions/needs

## 2023-08-01 ENCOUNTER — HOSPITAL ENCOUNTER (OUTPATIENT)
Dept: MAMMOGRAPHY | Facility: CLINIC | Age: 53
Discharge: HOME | End: 2023-08-01
Payer: COMMERCIAL

## 2023-08-01 ENCOUNTER — HOSPITAL ENCOUNTER (OUTPATIENT)
Dept: ULTRASOUND IMAGING | Facility: CLINIC | Age: 53
Discharge: HOME | End: 2023-08-01
Payer: COMMERCIAL

## 2023-08-01 VITALS — SYSTOLIC BLOOD PRESSURE: 135 MMHG | DIASTOLIC BLOOD PRESSURE: 82 MMHG | HEART RATE: 83 BPM

## 2023-08-01 DIAGNOSIS — R92.8 ABNORMAL MAMMOGRAM: ICD-10-CM

## 2023-08-01 PROCEDURE — A4648 IMPLANTABLE TISSUE MARKER: HCPCS

## 2023-08-01 PROCEDURE — 88341 IMHCHEM/IMCYTCHM EA ADD ANTB: CPT | Performed by: PATHOLOGY

## 2023-08-01 PROCEDURE — 19083 BX BREAST 1ST LESION US IMAG: CPT

## 2023-08-01 PROCEDURE — 88312 SPECIAL STAINS GROUP 1: CPT | Performed by: PATHOLOGY

## 2023-08-01 PROCEDURE — 88342 IMHCHEM/IMCYTCHM 1ST ANTB: CPT | Performed by: PATHOLOGY

## 2023-08-01 PROCEDURE — 88305 TISSUE EXAM BY PATHOLOGIST: CPT | Performed by: PATHOLOGY

## 2023-08-01 RX ORDER — LIDOCAINE HYDROCHLORIDE 10 MG/ML
5 INJECTION, SOLUTION EPIDURAL; INFILTRATION; INTRACAUDAL; PERINEURAL ONCE
Status: COMPLETED | OUTPATIENT
Start: 2023-08-01 | End: 2023-08-01

## 2023-08-01 RX ADMIN — LIDOCAINE HYDROCHLORIDE 5 ML: 10 INJECTION, SOLUTION EPIDURAL; INFILTRATION; INTRACAUDAL; PERINEURAL at 14:13

## 2023-08-01 NOTE — PROGRESS NOTES
Procedure type:    __x___ultrasound guided _____stereotactic    Breast:    _____Left __x___Right    Location: 10 o'clock 8cmfn    Needle: 12G [place]    # of passes: 3    Clip: Hydromark Butterfly    Performed by: Dr. Perrin Link held for 5 minutes by: Sweta Rebollar Strips:    ___X__yes _____no    Band aid:    __X___yes_____no    Tolerated procedure:    __X___yes _____no

## 2023-08-03 ENCOUNTER — TELEPHONE (OUTPATIENT)
Dept: MAMMOGRAPHY | Facility: CLINIC | Age: 53
End: 2023-08-03

## 2023-08-03 PROCEDURE — 88305 TISSUE EXAM BY PATHOLOGIST: CPT | Performed by: PATHOLOGY

## 2023-08-03 PROCEDURE — 88342 IMHCHEM/IMCYTCHM 1ST ANTB: CPT | Performed by: PATHOLOGY

## 2023-08-03 PROCEDURE — 88341 IMHCHEM/IMCYTCHM EA ADD ANTB: CPT | Performed by: PATHOLOGY

## 2023-08-03 PROCEDURE — 88312 SPECIAL STAINS GROUP 1: CPT | Performed by: PATHOLOGY

## 2023-08-03 NOTE — PROGRESS NOTES
Post procedure call completed 8/3/2023    Bleeding: _____yes __X___no    Pain: _____yes ___X___no    Redness/Swelling: ______yes ___X___no    Band aid removed: _x____yes _____no     Steri-Strips intact: ___X___yes _____no (discussed with patient to remove steri strips on 8/6/2023.  if they have not come off on their own)    Pt with no questions at this time, adv will call when results available, adv to call with any questions or concerns, has name/# for contact

## 2023-08-09 ENCOUNTER — VBI (OUTPATIENT)
Dept: ADMINISTRATIVE | Facility: OTHER | Age: 53
End: 2023-08-09

## 2023-11-29 NOTE — PATIENT INSTRUCTIONS
Deficiencia de vitamina D   CUIDADO AMBULATORIO:   Deficiencia de vitamina D  es un nivel bajo de vitamina D en brandt cuerpo  La vitamina D ayuda a que brandt cuerpo absorba el calcio de los alimentos  Brandt cuerpo produce vitamina D cuando brandt piel está expuesta a los thony del sol  Usted también puede adquirir la vitamina D de ciertos alimentos patrick pescado, huevos y judi  La mayoría de la vitamina D en brandt cuerpo proviene de la exposición a los thony de sol  Los síntomas más comunes [facility] siguientes:  Los bajos niveles de vitamina D pueden provocar que los huesos se [place] y frágiles y que eulogio más propensos a fracturas  Es posible que no presente signos ni síntomas o yelena usted puede presentar alguno de los siguientes signos:  · Dolor de huesos o incomodidad lumbar, en brandt pelvis o piernas    · Dolor y debilidad en los músculos    · Dolor lumbar en las mujeres    · Crecimiento deficiente, irritabilidad y infecciones respiratorias frecuentes en lactantes    · Huesos deformes y crecimiento deficiente en niños  Comuníquese con brandt médico si presenta los siguientes síntomas:   · Síntomas que continúan o empeoran    · [place], vómito o dolor de colette después de anshu tomado mucho del suplemento de vitamina D    · Preguntas o inquietudes sobre brandt condición o cuidados  El tratamiento para la deficiencia de vitamina D  incluye teresita dosis susu de vitamina D por 8 a 12 semanas para elevar telma niveles  Telma niveles se volverán a revisar  Si los niveles todavía están bajos, será necesario que tome suplementos de vitamina D por otras 8 semanas  Después que telma niveles vuelvan a la normalidad, podría ser necesario seguir tomando el suplemento de vitamina D  La cantidad de vitamina D que usted necesita cody a diario:  La cantidad de vitamina D que necesita depende de brandt edad  Es posible que necesite más de la cantidad recomendada en la lista a continuación si usted deuce ciertos medicamentos o es sheila   Pregunte a brandt Teaching Attestation:  I have personally interviewed and examined the patient via face-to-face. I confirmed the findings listed below:     Principal Problem:    Aortic stenosis, severe  Hemorrhagic shock  Acute blood loss anemia complicating chronic anemia   Retroperitoneal hemorrhage  History of A-fib on warfarin  Coronary artery disease status post CABG x4 in 2008  History of pacemaker  History of HTN  HLD  BPH  CKD      This was discussed with the resident and I agree with the assessment and plan as documented. The plan of care was discussed with the patient and family.     Óscar is admitted to MICU post-TAVR for RP hemorrhage after left iliac artery perforation. He required 3u pRBC during the procedure due to hypotension. Bleeding was controlled after placement of 2 covered stents. After arrival in MICU he again became hypotensive initially responsive to a total of 1L LR but then required 1 additional RBC.     24 hour events:  Received 4 u blood after left iliac artery perforation. BP improved overnight and remained hemodynamically stable with no further drops in H/H.     Plan:   - restart home metoprolol and ASA per Cardiology, defer to Dr. Sanabria restarting remaining anti-hypertensives  - daily H/H, remove right femoral venous sheath  - restart home spironolactone, tamsulosin  - increase bowel regimen     Care Time  I spent 25 minutes providing independent care management for this patient apart from separately billable procedures and teaching. The patient is ill due to the above. My management included development of plan above, medication adjustment, complex lab interpretation, imaging interpretation, discussion with consultants, and discussion with family/patient.       Ni Sánchez MD, PhD  Emergency and Critical Care Medicine  [facility]    médico cuál es la cantidad de vitamina D que usted necesita  · Lactantes hasta 1 año de edad: 400 unidades internacionales (UI)    · Niños de 1 año y mayores: 600 UI    · Adultos entre 19 a 79 años de edad: 600 UI    · Adultos mayores de 70 años de edad: 800 UI  Prevenga la deficiencia de la vitamina D:   · Consuma alimentos ricos en vitamina D  Pescados grasos patrick la caballa, el salmón, atún y alejandra en ramiro son las mejores palma de vitamina D  [address], el jugo y los cereales son fortificados con vitamina D      · Proporcione a castro bebé lactante un suplemento de vitamina D  de 400 UI al día  · [address] [place]  Altas dosis de vitamina D pueden ser tóxicas  Castro médico le indicara cuánta cantidad de vitamina D usted debe cody al día  La vitamina D se absorbe mejor cuando se deuce con alimentos  · Exponga castro piel a la katty del sol según las indicaciones  Pregunte a castro médico cómo puede cody el sol sin peligro y por cuánto [place]  Demasiada katty solar puede causar cáncer  Acuda a deisy consultas de control con castro médico según le indicaron  Anote deisy preguntas para que se acuerde de hacerlas marcela deisy visitas  © 2017 [address] [facility] Information is for End User's use only and may not be sold, redistributed or otherwise used for commercial purposes  All illustrations and images included in CareNotes® are the copyrighted property of A D A [employer]  or Chance Martinez  Esta información es sólo para uso en educación  Castro intención no es darle un consejo médico sobre enfermedades o tratamientos  Colsulte con castro Sidney Varghese farmacéutico antes de seguir cualquier régimen médico para saber si es seguro y efectivo para usted  Dieta baja en sodio   CUIDADO AMBULATORIO:   Teresita dieta baja en sodio  limita el consumo de alimentos que tienen alto contenido de sodio (sal)   Usted tendrá que seguir teresita dieta baja en sodio si padece de presión arterial susu, enfermedad del riñón o insuficiencia cardíaca  Es posible también que tenga que seguir esta dieta si padece teresita condición que hace que brandt cuerpo retenga líquidos  Es posible que deba limitar la cantidad de sodio que consume a 1500 mg  Pregúntele a brandt médico cuánto sodio puede consumir por día  Cómo usar las etiquetas de los alimentos para escoger los que son bajos en sodio:  Hillary las etiquetas de los alimentos para encontrar la cantidad de sodio que contienen  La cantidad de sodio está incluida en miligramos (mg)  La columna del porcentaje de valor diario indica la cantidad de necesidades diarias satisfechas con 1 porción del alimento para cada nutriente en la lista  Escoja alimentos que tengan menos de 5% del porcentaje diario de [place]  Estos alimentos se consideran bajos en sodio  Los alimentos que tienen 20% o más del porcentaje diario de sodio se consideran alimentos altos en sodio  Algunas etiquetas de alimentos podrían también incluir cualquiera de los siguientes términos que indican el contenido de sodio en los alimentos:  · Koko de sodio:  Menos de 5 mg de sodio en cada porción    · Sodio muy bajo:  35 mg de sodio o menos por porción    · Bajo sodio:  140 mg de sodio o menos por porción    · Sodio reducido:  Por lo menos 25% menos de sodio en cada porción que el tipo regular    · Melissa en sodio:  50% menos de sodio en cada porción    · Sin sal o sin sal adicional:  No se ha agregado sal adicional marcela el procesamiento de los alimentos (el alimento en sí aún podría contener sodio)  Alimentos que se deben evitar:  Los alimentos salados son altos en sodio   Se debe evitar lo siguiente:  · Alimentos procesados:      ¨ Mezclas para hacer pan de maíz, panecitos, pastel, y pudin     [address] [place] [address] instantáneos, patrick judson, cereales, macarrones y arroz     ¨ Alimentos empacados, patrick relleno de pan, mezclas para preparar arroz y pastas, mezclas para preparar dips, y macarrones con [employer] ¨ Alimentos enlatados, patrick vegetales enlatados, sopas, consomés, salsas, y [place] de [address] o tomate    ¨ Alimentos para merendar, patrick judson tostadas, palomitas de maíz, pretzels, piel de cerdo, [address] de [place] [facility], y nueces saladas    ¨ Alimentos congelados, patrick cenas, [place], vegetales en salsa, y camilla empanizadas    ¨ Sauerkraut, vegetales en escabeche, y otros alimentos preparados con vinagre    · Rebeccaside y quesos:      ¨ Camilla ahumadas o curadas, patrick carne preparada con [place], [place], jamón, perros calientes, y salchichas    ¨ Camilla o pastas enlatadas, patrick camilla preparadas en ollas de arcilla, alejandra, anchoas, e imitación de [facility] Rebeccaside para [address] [facility], patrick [place], [place], [place], y carne en rebanada    ¨ Queso procesado, patrick queso americano y pastas de queso    · Condimentos, salsas y especias:      ¨ Surjit (¼ de cucharadita de surjit contiene 575 mg de sodio)    ¨ Especias hechas con surjit, patrick sal de ajo, sal de apio, sal de cebolla y sal con condimentos    ¨ Salsa de soya regular, salsa de [facility], [address] [facility], salsa para [facility], [place] [employer], y la mayoría de las vinagres con sabor    ¨ Salsa enlatada para camilla y mezclas enlatadas     ¨ Condimentos regulares, patrick la mostaza, la salsa de [place], y los aderezos para ensalada    ¨ Pepinillos y aceitunas    ¨ Ablandadores de camilla y glutamato de monosodio  Alimentos que puede incluir:  Hillary las etiquetas de los alimentos para encontrar la cantidad exacta de sodio de cada porción  · [place] y cereal:  Trate de elegir panes con menos de 80 mg de sodio por porción       ¨ Pan, panecillo, pan estilo michele, tortilla o galletas sin sal     ¨ Cereales preparados con menos de 5% del valor diario de sodio (por ejemplo, kemal triturado y arroz inflado)    ¨ Pasta    · Verduras y frutas:      ¨ Vegetales frescos sin sal, congelados o enlatados    ¨ Frutas frescas, congeladas o enlatadas    ¨ Jugo de frutas    · Productos lácteos: Teresita porción tiene aproximadamente 150 mg de sodio  Verlinda Carrier, todos los tipos    ¨ Yogur    ¨ Queso cynthia, patrick queso cheddar, suizo, [employer] o mozzarella    · Rebeccaside y otros alimentos con proteína:  Algunas camilla crudas [place] sodio extra  ¨ Camilla sin aditivos, pescado y carne de ave     ¨ Huevos    · Otros alimentos:      ¨ Pudín casero    ¨ Trego sin sal, palomitas de maíz o galletas saladas    ¨ Mantequilla o margarina sin sal  Modos de disminuir el consumo de sodio:   · Agregue hierbas y especias a los alimentos en lugar de sal marcela la cocción  Utilice condimentos sin sal para agregar sabor a los alimentos  Por ejemplo: cebolla en polvo, ajo en polvo, [place], mcdonough en polvo, pimentón y perejil  Use jugo de lima, mike o vinagre para darle a los alimentos un sabor ácido  Use chiles picantes, mishel o mishel de [place] para agregar un sabor picante a los alimentos  · No ponga el salero en la whyte de la cocina  [place] puede evitar que añada sal a los alimentos en la whyte  Puede llevar un tiempo acostumbrarse a disfrutar el sabor natural de los alimentos en lugar de agregar sal  Consulte con brandt médico antes de usar sustitutos de la sal  Algunos sustitutos de la sal tienen teresita susu cantidad de potasio y deben evitarse si usted tiene teresita enfermedad en los riñones  · Escoja alimentos bajos en sodio en los restaurantes  Las comidas de los restaurantes [place] siempre son altas en sodio  Algunos restaurantes ofrecen información nutricional en el menú que indica la cantidad de sodio de deisy alimentos  De ser posible, pida que preparen brandt comida con menos sal o sin sal      · Compre alimentos y aperitivos sin sal o bajos en sodio en el supermercado  Por ejemplo: caldos, sopas y vegetales enlatados sin clif o bajos en sodio  Elija verduras frescas o congeladas en brandt lugar  Elija nueces o semillas sin sal o frutas o verduras frescas patrick bocadillos   Hillary las etiquetas de los alimentos y [employer] los alimentos sin sal o con bajo o muy bajo contenido de [place]im[place]  © 2017 [address] Information is for End User's use only and may not be sold, redistributed or otherwise used for commercial purposes  All illustrations and images included in CareNotes® are the copyrighted property of A MERLE A M , Inc  or Chance Martinez  Esta información es sólo para uso en educación  Castro intención no es darle un consejo médico sobre enfermedades o tratamientos  Colsulte con castro Inocencio Romero farmacéutico antes de seguir cualquier régimen médico para saber si es seguro y efectivo para usted

## 2023-12-12 ENCOUNTER — OFFICE VISIT (OUTPATIENT)
Dept: FAMILY MEDICINE CLINIC | Facility: CLINIC | Age: 53
End: 2023-12-12
Payer: COMMERCIAL

## 2023-12-12 VITALS
HEART RATE: 80 BPM | TEMPERATURE: 98 F | OXYGEN SATURATION: 99 % | BODY MASS INDEX: 40.75 KG/M2 | DIASTOLIC BLOOD PRESSURE: 68 MMHG | WEIGHT: 230 LBS | HEIGHT: 63 IN | SYSTOLIC BLOOD PRESSURE: 126 MMHG

## 2023-12-12 DIAGNOSIS — R49.0 HOARSENESS OF VOICE: ICD-10-CM

## 2023-12-12 DIAGNOSIS — Z13.6 SCREENING FOR CARDIOVASCULAR CONDITION: ICD-10-CM

## 2023-12-12 DIAGNOSIS — Z00.00 ANNUAL PHYSICAL EXAM: Primary | ICD-10-CM

## 2023-12-12 PROCEDURE — 99396 PREV VISIT EST AGE 40-64: CPT | Performed by: FAMILY MEDICINE

## 2023-12-12 NOTE — PATIENT INSTRUCTIONS
Wellness Visit for Adults   AMBULATORY CARE:   A wellness visit  is when you see your healthcare provider to get screened for health problems. Your healthcare provider will also give you advice on how to stay healthy. Write down your questions so you remember to ask them. Ask your healthcare provider how often you should have a wellness visit. What happens at a wellness visit:  Your healthcare provider will ask about your health, and your family history of health problems. This includes high blood pressure, heart disease, and cancer. He or she will ask if you have symptoms that concern you, if you smoke, and about your mood. You may also be asked about your intake of medicines, supplements, food, and alcohol. Any of the following may be done: Your weight  will be checked. Your height may also be checked so your body mass index (BMI) can be calculated. Your BMI shows if you are at a healthy weight. Your blood pressure  and heart rate will be checked. Your temperature may also be checked. Blood and urine tests  may be done. Blood tests may be done to check your cholesterol levels. Abnormal cholesterol levels increase your risk for heart disease and stroke. You may also need a blood or urine test to check for diabetes if you are at increased risk. Urine tests may be done to look for signs of an infection or kidney disease. A physical exam  includes checking your heartbeat and lungs with a stethoscope. Your healthcare provider may also check your skin to look for sun damage. Screening tests  may be recommended. A screening test is done to check for diseases that may not cause symptoms. The screening tests you may need depend on your age, gender, family history, and lifestyle habits. For example, colorectal screening may be recommended if you are 48years old or older. Screening tests you need if you are a woman:   A Pap smear  is used to screen for cervical cancer.  Pap smears are usually done every 3 to 5 years depending on your age. You may need them more often if you have had abnormal Pap smear test results in the past. Ask your healthcare provider how often you should have a Pap smear. A mammogram  is an x-ray of your breasts to screen for breast cancer. Experts recommend mammograms every 2 years starting at age 48 years. You may need a mammogram at age 52 years or younger if you have an increased risk for breast cancer. Talk to your healthcare provider about when you should start having mammograms and how often you need them. Vaccines you may need:   Get an influenza vaccine  every year. The influenza vaccine protects you from the flu. Several types of viruses cause the flu. The viruses change over time, so new vaccines are made each year. Get a tetanus-diphtheria (Td) booster vaccine  every 10 years. This vaccine protects you against tetanus and diphtheria. Tetanus is a severe infection that may cause painful muscle spasms and lockjaw. Diphtheria is a severe bacterial infection that causes a thick covering in the back of your mouth and throat. Get a human papillomavirus (HPV) vaccine  if you are female and aged 23 to 32 or male 23 to 24 and never received it. This vaccine protects you from HPV infection. HPV is the most common infection spread by sexual contact. HPV may also cause vaginal, penile, and anal cancers. Get a pneumococcal vaccine  if you are aged 72 years or older. The pneumococcal vaccine is an injection given to protect you from pneumococcal disease. Pneumococcal disease is an infection caused by pneumococcal bacteria. The infection may cause pneumonia, meningitis, or an ear infection. Get a shingles vaccine  if you are 60 or older, even if you have had shingles before. The shingles vaccine is an injection to protect you from the varicella-zoster virus. This is the same virus that causes chickenpox.  Shingles is a painful rash that develops in people who had chickenpox or have been exposed to the virus. How to eat healthy:  My Plate is a model for planning healthy meals. It shows the types and amounts of foods that should go on your plate. Fruits and vegetables make up about half of your plate, and grains and protein make up the other half. A serving of dairy is included on the side of your plate. The amount of calories and serving sizes you need depends on your age, gender, weight, and height. Examples of healthy foods are listed below:  Eat a variety of vegetables  such as dark green, red, and orange vegetables. You can also include canned vegetables low in sodium (salt) and frozen vegetables without added butter or sauces. Eat a variety of fresh fruits , canned fruit in 100% juice, frozen fruit, and dried fruit. Include whole grains. At least half of the grains you eat should be whole grains. Examples include whole-wheat bread, wheat pasta, brown rice, and whole-grain cereals such as oatmeal.    Eat a variety of protein foods such as seafood (fish and shellfish), lean meat, and poultry without skin (turkey and chicken). Examples of lean meats include pork leg, shoulder, or tenderloin, and beef round, sirloin, tenderloin, and extra lean ground beef. Other protein foods include eggs and egg substitutes, beans, peas, soy products, nuts, and seeds. Choose low-fat dairy products such as skim or 1% milk or low-fat yogurt, cheese, and cottage cheese. Limit unhealthy fats  such as butter, hard margarine, and shortening. Exercise:  Exercise at least 30 minutes per day on most days of the week. Some examples of exercise include walking, biking, dancing, and swimming. You can also fit in more physical activity by taking the stairs instead of the elevator or parking farther away from stores. Include muscle strengthening activities 2 days each week. Regular exercise provides many health benefits.  It helps you manage your weight, and decreases your risk for type 2 diabetes, heart disease, stroke, and high blood pressure. Exercise can also help improve your mood. Ask your healthcare provider about the best exercise plan for you. General health and safety guidelines:   Do not smoke. Nicotine and other chemicals in cigarettes and cigars can cause lung damage. Ask your healthcare provider for information if you currently smoke and need help to quit. E-cigarettes or smokeless tobacco still contain nicotine. Talk to your healthcare provider before you use these products. Limit alcohol. A drink of alcohol is 12 ounces of beer, 5 ounces of wine, or 1½ ounces of liquor. Lose weight, if needed. Being overweight increases your risk of certain health conditions. These include heart disease, high blood pressure, type 2 diabetes, and certain types of cancer. Protect your skin. Do not sunbathe or use tanning beds. Use sunscreen with a SPF 15 or higher. Apply sunscreen at least 15 minutes before you go outside. Reapply sunscreen every 2 hours. Wear protective clothing, hats, and sunglasses when you are outside. Drive safely. Always wear your seatbelt. Make sure everyone in your car wears a seatbelt. A seatbelt can save your life if you are in an accident. Do not use your cell phone when you are driving. This could distract you and cause an accident. Pull over if you need to make a call or send a text message. Practice safe sex. Use latex condoms if are sexually active and have more than one partner. Your healthcare provider may recommend screening tests for sexually transmitted infections (STIs). Wear helmets, lifejackets, and protective gear. Always wear a helmet when you ride a bike or motorcycle, go skiing, or play sports that could cause a head injury. Wear protective equipment when you play sports. Wear a lifejacket when you are on a boat or doing water sports.     © Copyright Marlon Valle 2023 Information is for End User's use only and may not be sold, redistributed or otherwise used for commercial purposes. The above information is an  only. It is not intended as medical advice for individual conditions or treatments. Talk to your doctor, nurse or pharmacist before following any medical regimen to see if it is safe and effective for you.

## 2023-12-12 NOTE — PROGRESS NOTES
[address]    NAME: Saintclair Duffel  AGE: 52 y.o. SEX: female  : 1970     DATE: 2023     Assessment and Plan:     Problem List Items Addressed This Visit        Other    Annual physical exam - Primary     Had pap 2023.  - check labs         Hoarseness of voice     Past 2-3 months. Likely from gerd or seasonal allergies. Will check labs first.         Relevant Orders    TSH, 3rd generation with Free T4 reflex    Screening for cardiovascular condition     labs         Relevant Orders    Lipid panel    Hemoglobin A1C    Comprehensive metabolic panel    TSH, 3rd generation with Free T4 reflex         Immunizations and preventive care screenings were discussed with patient today. Appropriate education was printed on patient's after visit summary. Counseling:  Alcohol/drug use: discussed moderation in alcohol intake, the recommendations for healthy alcohol use, and avoidance of illicit drug use. Dental Health: discussed importance of regular tooth brushing, flossing, and dental visits. Injury prevention: discussed safety/seat belts, safety helmets, smoke detectors, carbon dioxide detectors, and smoking near bedding or upholstery. Sexual health: discussed sexually transmitted diseases, partner selection, use of condoms, avoidance of unintended pregnancy, and contraceptive alternatives. Exercise: the importance of regular exercise/physical activity was discussed. Recommend exercise 3-5 times per week for at least 30 minutes. BMI Counseling: Body mass index is 40.74 kg/m².  The BMI is above normal. Nutrition recommendations include decreasing portion sizes, encouraging healthy choices of fruits and vegetables, decreasing fast food intake, consuming healthier snacks, limiting drinks that contain sugar, moderation in carbohydrate intake, increasing intake of lean protein, reducing intake of saturated and trans fat and reducing intake of cholesterol. Exercise recommendations include moderate physical activity 150 minutes/week, exercising 3-5 times per week and strength training exercises. Rationale for BMI follow-up plan is due to patient being overweight or obese. Depression Screening and Follow-up Plan: Patient was screened for depression during today's encounter. They screened negative with a PHQ-2 score of 0. No follow-ups on file. Chief Complaint:     Chief Complaint   Patient presents with   • Physical Exam   • Well Check      History of Present Illness:     Adult Annual Physical   Patient here for a comprehensive physical exam. The patient reports problems - sore throat . Diet and Physical Activity  Diet/Nutrition: well balanced diet and consuming 3-5 servings of fruits/vegetables daily. Exercise: no formal exercise. Depression Screening  PHQ-2/9 Depression Screening    Little interest or pleasure in doing things: 0 - not at all  Feeling down, depressed, or hopeless: 0 - not at all  PHQ-2 Score: 0  PHQ-2 Interpretation: Negative depression screen       General Health  Sleep: sleeps well and gets 7-8 hours of sleep on average. Hearing: normal - bilateral.  Vision: no vision problems and most recent eye exam <1 year ago. Dental: regular dental visits and brushes teeth twice daily. /GYN Health  Follows with gynecology? yes   Patient is: postmenopausal  Last menstrual period: >1 year ago  Contraceptive method:  none . Advanced Care Planning  Do you have an advanced directive? no  Do you have a durable medical power of ? no     Review of Systems:     Review of Systems   Constitutional:  Negative for chills and fever. HENT:  Positive for sore throat. Negative for ear pain. Eyes:  Negative for pain and visual disturbance. Respiratory:  Negative for cough and shortness of breath. Cardiovascular:  Negative for chest pain and palpitations.    Gastrointestinal:  Negative for abdominal pain and vomiting. Genitourinary:  Negative for dysuria and hematuria. Musculoskeletal:  Negative for arthralgias and back pain. Skin:  Negative for color change and rash. Neurological:  Negative for seizures and syncope. All other systems reviewed and are negative. Past Medical History:     Past Medical History:   Diagnosis Date   • Colon polyp    • Depression with anxiety     last assessed-2016   • Hx of varicose veins    • Nabothian cyst    • Osteoarthritis    • TMJ arthralgia       Past Surgical History:     Past Surgical History:   Procedure Laterality Date   •  SECTION     • COLONOSCOPY W/ POLYPECTOMY  2020   • ENDOMETRIAL BIOPSY      without cervical dilation   • KNEE ARTHROPLASTY     • KNEE SURGERY     • TUBAL LIGATION      last assessed-10/15/2014   • US GUIDED BREAST BIOPSY RIGHT COMPLETE Right 2023      Social History:     Social History     Socioeconomic History   • Marital status: /Civil Union     Spouse name: None   • Number of children: None   • Years of education: None   • Highest education level: None   Occupational History     Comment: full-time employment   Tobacco Use   • Smoking status: Never   • Smokeless tobacco: Never   Vaping Use   • Vaping Use: Never used   Substance and Sexual Activity   • Alcohol use:  Yes   • Drug use: No   • Sexual activity: Yes   Other Topics Concern   • None   Social History Narrative    Coffee    Exercise frequency (times/week)     Social Determinants of Health     Financial Resource Strain: Low Risk  (2019)    Overall Financial Resource Strain (CARDIA)    • Difficulty of Paying Living Expenses: Not hard at all   Food Insecurity: No Food Insecurity (2019)    Hunger Vital Sign    • Worried About Running Out of Food in the Last Year: Never true    • Ran Out of Food in the Last Year: Never true   Transportation Needs: No Transportation Needs (2019)    PRAPARE - Transportation    • Lack of Transportation (Medical): No    • Lack of Transportation (Non-Medical): No   Physical Activity: Inactive (11/25/2019)    Exercise Vital Sign    • Days of Exercise per Week: 0 days    • Minutes of Exercise per Session: 0 min   Stress: No Stress Concern Present (11/25/2019)    [facility]    • Feeling of Stress :  Only a little   Social Connections: Unknown (11/25/2019)    Social Connection and Isolation Panel [NHANES]    • Frequency of Communication with Friends and Family: Patient refused    • Frequency of Social Gatherings with Friends and Family: Patient refused    • Attends [employer] Services: Patient refused    • Active Member of Clubs or Organizations: Patient refused    • Attends Club or Organization Meetings: Patient refused    • Marital Status: Patient refused   Intimate Partner Violence: Unknown (11/25/2019)    Humiliation, Afraid, Rape, and Kick questionnaire    • Fear of Current or Ex-Partner: Patient refused    • Emotionally Abused: Patient refused    • Physically Abused: Patient refused    • Sexually Abused: Patient refused   Housing Stability: Not on file      Family History:     Family History   Problem Relation Age of Onset   • Hypertension Mother    • Osteoporosis Mother    • Heart attack Father    • Hypertension Sister    • Diabetes type II Sister    • Thyroid disease Sister    • Hypertension Brother    • Asthma Daughter    • No Known Problems Maternal Grandmother    • No Known Problems Maternal Grandfather    • No Known Problems Paternal Grandmother    • No Known Problems Paternal Grandfather    • No Known Problems Sister    • No Known Problems Maternal Aunt    • Breast cancer Cousin 48   • No Known Problems Son    • No Known Problems Son    • No Known Problems Paternal Aunt       Current Medications:     Current Outpatient Medications   Medication Sig Dispense Refill   • aspirin 81 MG tablet Take 81 mg by mouth if needed       No current facility-administered medications for this visit. Allergies:     No Known Allergies   Physical Exam:     /68 (BP Location: Left arm, Patient Position: Sitting, Cuff Size: Large)   Pulse 80   Temp 98 °F (36.7 °C) (Temporal)   Ht 5' 3" (1.6 m)   Wt 104 kg (230 lb)   LMP 05/21/2019   SpO2 99%   BMI 40.74 kg/m²     Physical Exam  Vitals reviewed. Constitutional:       General: She is not in acute distress. Appearance: Normal appearance. She is well-developed. HENT:      Head: Normocephalic and atraumatic. Right Ear: There is no impacted cerumen. Left Ear: There is no impacted cerumen. Nose: Nose normal. No congestion or rhinorrhea. Mouth/Throat:      Mouth: Mucous membranes are moist.      Pharynx: Oropharynx is clear. No oropharyngeal exudate or posterior oropharyngeal erythema. Eyes:      General: No scleral icterus. Extraocular Movements: Extraocular movements intact. Conjunctiva/sclera: Conjunctivae normal.      Pupils: Pupils are equal, round, and reactive to light. Cardiovascular:      Rate and Rhythm: Normal rate and regular rhythm. Pulses: Normal pulses. Heart sounds: Normal heart sounds. No murmur heard. Pulmonary:      Effort: Pulmonary effort is normal. No respiratory distress. Breath sounds: Normal breath sounds. Abdominal:      General: Bowel sounds are normal.      Palpations: Abdomen is soft. Tenderness: There is no abdominal tenderness. Musculoskeletal:         General: No swelling. Cervical back: Normal range of motion and neck supple. Skin:     General: Skin is warm and dry. Capillary Refill: Capillary refill takes less than 2 seconds. Neurological:      Mental Status: She is alert and oriented to person, place, and time. Mental status is at baseline. Motor: No weakness. Gait: Gait normal.   Psychiatric:         Mood and Affect: Mood normal.         Behavior: Behavior normal.         Thought Content:  Thought content normal.         Judgment: Judgment normal.          Royal Chávez, DO  [address]

## 2023-12-16 ENCOUNTER — APPOINTMENT (OUTPATIENT)
Dept: LAB | Facility: HOSPITAL | Age: 53
End: 2023-12-16
Payer: COMMERCIAL

## 2023-12-16 DIAGNOSIS — R49.0 HOARSENESS OF VOICE: ICD-10-CM

## 2023-12-16 DIAGNOSIS — Z13.6 SCREENING FOR CARDIOVASCULAR CONDITION: ICD-10-CM

## 2023-12-16 LAB
ALBUMIN SERPL BCP-MCNC: 4.1 G/DL (ref 3.5–5)
ALP SERPL-CCNC: 63 U/L (ref 34–104)
ALT SERPL W P-5'-P-CCNC: 29 U/L (ref 7–52)
ANION GAP SERPL CALCULATED.3IONS-SCNC: 4 MMOL/L
AST SERPL W P-5'-P-CCNC: 22 U/L (ref 13–39)
BILIRUB SERPL-MCNC: 0.47 MG/DL (ref 0.2–1)
BUN SERPL-MCNC: 18 MG/DL (ref 5–25)
CALCIUM SERPL-MCNC: 9.9 MG/DL (ref 8.4–10.2)
CHLORIDE SERPL-SCNC: 104 MMOL/L (ref 96–108)
CHOLEST SERPL-MCNC: 178 MG/DL
CO2 SERPL-SCNC: 31 MMOL/L (ref 21–32)
CREAT SERPL-MCNC: 0.78 MG/DL (ref 0.6–1.3)
EST. AVERAGE GLUCOSE BLD GHB EST-MCNC: 120 MG/DL
GFR SERPL CREATININE-BSD FRML MDRD: 85 ML/MIN/1.73SQ M
GLUCOSE P FAST SERPL-MCNC: 90 MG/DL (ref 65–99)
HBA1C MFR BLD: 5.8 %
HDLC SERPL-MCNC: 59 MG/DL
LDLC SERPL CALC-MCNC: 101 MG/DL (ref 0–100)
NONHDLC SERPL-MCNC: 119 MG/DL
POTASSIUM SERPL-SCNC: 4.4 MMOL/L (ref 3.5–5.3)
PROT SERPL-MCNC: 7.7 G/DL (ref 6.4–8.4)
SODIUM SERPL-SCNC: 139 MMOL/L (ref 135–147)
TRIGL SERPL-MCNC: 88 MG/DL
TSH SERPL DL<=0.05 MIU/L-ACNC: 2.04 UIU/ML (ref 0.45–4.5)

## 2023-12-16 PROCEDURE — 84443 ASSAY THYROID STIM HORMONE: CPT

## 2023-12-16 PROCEDURE — 80061 LIPID PANEL: CPT

## 2023-12-16 PROCEDURE — 80053 COMPREHEN METABOLIC PANEL: CPT

## 2023-12-16 PROCEDURE — 36415 COLL VENOUS BLD VENIPUNCTURE: CPT

## 2023-12-16 PROCEDURE — 83036 HEMOGLOBIN GLYCOSYLATED A1C: CPT

## 2024-02-10 PROBLEM — Z13.6 SCREENING FOR CARDIOVASCULAR CONDITION: Status: RESOLVED | Noted: 2023-12-12 | Resolved: 2024-02-10

## 2024-02-21 PROBLEM — Z12.4 CERVICAL CANCER SCREENING: Status: RESOLVED | Noted: 2018-03-27 | Resolved: 2024-02-21

## 2024-02-21 PROBLEM — Z01.419 GYNECOLOGIC EXAM NORMAL: Status: RESOLVED | Noted: 2018-03-27 | Resolved: 2024-02-21

## 2024-04-02 ENCOUNTER — CLINICAL SUPPORT (OUTPATIENT)
Dept: FAMILY MEDICINE CLINIC | Facility: CLINIC | Age: 54
End: 2024-04-02
Payer: COMMERCIAL

## 2024-04-02 DIAGNOSIS — R21 RASH: Primary | ICD-10-CM

## 2024-04-02 DIAGNOSIS — Z23 ENCOUNTER FOR IMMUNIZATION: Primary | ICD-10-CM

## 2024-04-02 PROCEDURE — 90750 HZV VACC RECOMBINANT IM: CPT

## 2024-04-02 PROCEDURE — 90471 IMMUNIZATION ADMIN: CPT

## 2024-04-02 RX ORDER — OMEPRAZOLE 20 MG/1
20 CAPSULE, DELAYED RELEASE ORAL DAILY
COMMUNITY

## 2024-04-02 NOTE — PROGRESS NOTES
Patient here today for Shingrix vaccine, per  patient received immunization in left deltoid, patient tolerated immunization well with no complaints and will come back for second dose in two months.

## 2024-06-04 ENCOUNTER — CLINICAL SUPPORT (OUTPATIENT)
Dept: FAMILY MEDICINE CLINIC | Facility: CLINIC | Age: 54
End: 2024-06-04
Payer: COMMERCIAL

## 2024-06-04 DIAGNOSIS — Z23 ENCOUNTER FOR IMMUNIZATION: Primary | ICD-10-CM

## 2024-06-04 PROCEDURE — 90750 HZV VACC RECOMBINANT IM: CPT

## 2024-06-04 PROCEDURE — 90471 IMMUNIZATION ADMIN: CPT

## 2024-06-04 NOTE — PROGRESS NOTES
After obtaining informed consent, per   the second Shingrix immunization was given to patient in right deltoid. Patient tolerated well with no complaints and will return as needed.

## 2024-07-25 DIAGNOSIS — Z12.31 ENCOUNTER FOR SCREENING MAMMOGRAM FOR BREAST CANCER: ICD-10-CM

## 2024-08-07 ENCOUNTER — HOSPITAL ENCOUNTER (OUTPATIENT)
Dept: MAMMOGRAPHY | Facility: MEDICAL CENTER | Age: 54
Discharge: HOME | End: 2024-08-07
Payer: COMMERCIAL

## 2024-08-07 VITALS — HEIGHT: 63 IN | BODY MASS INDEX: 40.75 KG/M2 | WEIGHT: 230 LBS

## 2024-08-07 DIAGNOSIS — Z12.31 ENCOUNTER FOR SCREENING MAMMOGRAM FOR BREAST CANCER: ICD-10-CM

## 2024-08-07 PROCEDURE — 77067 SCR MAMMO BI INCL CAD: CPT

## 2024-08-07 PROCEDURE — 77063 BREAST TOMOSYNTHESIS BI: CPT

## 2024-08-16 ENCOUNTER — HOSPITAL ENCOUNTER (OUTPATIENT)
Dept: RADIOLOGY | Facility: HOSPITAL | Age: 54
Discharge: HOME | End: 2024-08-16
Payer: COMMERCIAL

## 2024-08-16 ENCOUNTER — APPOINTMENT (OUTPATIENT)
Dept: LAB | Facility: HOSPITAL | Age: 54
End: 2024-08-16
Payer: COMMERCIAL

## 2024-08-16 DIAGNOSIS — M76.62 TENDONITIS, ACHILLES, LEFT: ICD-10-CM

## 2024-08-16 DIAGNOSIS — M79.672 LEFT FOOT PAIN: ICD-10-CM

## 2024-08-16 DIAGNOSIS — M72.2 PLANTAR FASCIAL FIBROMATOSIS: ICD-10-CM

## 2024-08-16 DIAGNOSIS — M77.42 METATARSALGIA OF LEFT FOOT: ICD-10-CM

## 2024-08-16 LAB
ALBUMIN SERPL BCG-MCNC: 3.7 G/DL (ref 3.5–5)
ALP SERPL-CCNC: 67 U/L (ref 34–104)
ALT SERPL W P-5'-P-CCNC: 27 U/L (ref 7–52)
ANION GAP SERPL CALCULATED.3IONS-SCNC: 7 MMOL/L (ref 4–13)
AST SERPL W P-5'-P-CCNC: 20 U/L (ref 13–39)
BILIRUB SERPL-MCNC: 0.55 MG/DL (ref 0.2–1)
BUN SERPL-MCNC: 17 MG/DL (ref 5–25)
CALCIUM SERPL-MCNC: 8.8 MG/DL (ref 8.4–10.2)
CHLORIDE SERPL-SCNC: 108 MMOL/L (ref 96–108)
CO2 SERPL-SCNC: 26 MMOL/L (ref 21–32)
CREAT SERPL-MCNC: 0.66 MG/DL (ref 0.6–1.3)
GFR SERPL CREATININE-BSD FRML MDRD: 99 ML/MIN/1.73SQ M
GLUCOSE P FAST SERPL-MCNC: 107 MG/DL (ref 65–99)
POTASSIUM SERPL-SCNC: 3.9 MMOL/L (ref 3.5–5.3)
PROT SERPL-MCNC: 7.1 G/DL (ref 6.4–8.4)
SODIUM SERPL-SCNC: 141 MMOL/L (ref 135–147)

## 2024-08-16 PROCEDURE — 36415 COLL VENOUS BLD VENIPUNCTURE: CPT

## 2024-08-16 PROCEDURE — 80053 COMPREHEN METABOLIC PANEL: CPT

## 2024-08-16 PROCEDURE — 73630 X-RAY EXAM OF FOOT: CPT

## 2024-08-20 ENCOUNTER — OFFICE VISIT (OUTPATIENT)
Dept: DERMATOLOGY | Facility: CLINIC | Age: 54
End: 2024-08-20
Payer: COMMERCIAL

## 2024-08-20 VITALS — BODY MASS INDEX: 40.24 KG/M2 | TEMPERATURE: 97.1 F | WEIGHT: 227.1 LBS | HEIGHT: 63 IN

## 2024-08-20 DIAGNOSIS — D22.9 MULTIPLE MELANOCYTIC NEVI: Primary | ICD-10-CM

## 2024-08-20 DIAGNOSIS — D18.01 CHERRY ANGIOMA: ICD-10-CM

## 2024-08-20 DIAGNOSIS — D48.5 NEOPLASM OF UNCERTAIN BEHAVIOR OF SKIN: ICD-10-CM

## 2024-08-20 DIAGNOSIS — L72.0 MILIUM: ICD-10-CM

## 2024-08-20 DIAGNOSIS — B36.0 TINEA VERSICOLOR: ICD-10-CM

## 2024-08-20 DIAGNOSIS — L81.4 LENTIGINES: ICD-10-CM

## 2024-08-20 DIAGNOSIS — L30.0 NUMMULAR ECZEMA: ICD-10-CM

## 2024-08-20 PROCEDURE — 11102 TANGNTL BX SKIN SINGLE LES: CPT | Performed by: STUDENT IN AN ORGANIZED HEALTH CARE EDUCATION/TRAINING PROGRAM

## 2024-08-20 PROCEDURE — 99214 OFFICE O/P EST MOD 30 MIN: CPT | Performed by: STUDENT IN AN ORGANIZED HEALTH CARE EDUCATION/TRAINING PROGRAM

## 2024-08-20 PROCEDURE — 88305 TISSUE EXAM BY PATHOLOGIST: CPT | Performed by: PATHOLOGY

## 2024-08-20 RX ORDER — CLOBETASOL PROPIONATE 0.5 MG/G
OINTMENT TOPICAL 2 TIMES DAILY
Qty: 15 G | Refills: 1 | Status: SHIPPED | OUTPATIENT
Start: 2024-08-20

## 2024-08-20 RX ORDER — KETOCONAZOLE 20 MG/G
CREAM TOPICAL DAILY
Qty: 60 G | Refills: 1 | Status: SHIPPED | OUTPATIENT
Start: 2024-08-20

## 2024-08-20 NOTE — PROGRESS NOTES
"[facility] Dermatology Clinic Note     Patient Name: Jeni Yoon  Encounter Date: 08/20/2024     Have you been cared for by a [facility] Dermatologist in the last 3 years and, if so, which description applies to you?    Yes.  I have been here within the last 3 years, and my medical history has NOT changed since that time.  I am FEMALE/of child-bearing potential.    REVIEW OF SYSTEMS:  Have you recently had or currently have any of the following? No changes in my recent health.   PAST MEDICAL HISTORY:  Have you personally ever had or currently have any of the following?  If \"YES,\" then please provide more detail. No changes in my medical history.   HISTORY OF IMMUNOSUPPRESSION: Do you have a history of any of the following:  Systemic Immunosuppression such as Diabetes, Biologic or Immunotherapy, Chemotherapy, Organ Transplantation, Bone Marrow Transplantation?  No     Answering \"YES\" requires the addition of the dotphrase \"IMMUNOSUPPRESSED\" as the first diagnosis of the patient's visit.   FAMILY HISTORY:  Any \"first degree relatives\" (parent, brother, sister, or child) with the following?    No changes in my family's known health.   PATIENT EXPERIENCE:    Do you want the Dermatologist to perform a COMPLETE skin exam today including a clinical examination under the \"bra and underwear\" areas?  Yes  If necessary, do we have your permission to call and leave a detailed message on your Preferred Phone number that includes your specific medical information?  Yes      No Known Allergies   Current Outpatient Medications:     aspirin 81 MG tablet, Take 81 mg by mouth if needed (Patient not taking: Reported on 8/20/2024), Disp: , Rfl:     hydrocortisone 2.5 % ointment, Apply topically 2 (two) times a day (Patient not taking: Reported on 8/20/2024), Disp: 20 g, Rfl: 3    omeprazole (PriLOSEC) 20 mg delayed release capsule, Take 20 mg by mouth daily (Patient not taking: Reported on 8/20/2024), Disp: , Rfl:           Whom " besides the patient is providing clinical information about today's encounter?   NO ADDITIONAL HISTORIAN (patient alone provided history)    Physical Exam and Assessment/Plan by Diagnosis:    MELANOCYTIC NEVI  -Relevant exam: Scattered over the trunk/extremities are homogenously pigmented brown macules and papules. ELM performed and without concerning findings.  - Exam and clinical history consistent with melanocytic nevi  - Educated on the ABCDE's of melanoma; handout provided  - Counseled to return to clinic prior to scheduled appointment should any of these lesions change or should any new lesions of concern arise  - Counseled on use of sun protection daily. Reviewed latest FDA sunscreen guidelines, including use of broad spectrum (UVA and UVB blocking) sunscreen or sun protective clothing with SPF 30-50 every 2-3 hours and reapplied after exposure to water; use of photoprotective clothing, including a broad brim hat and UPF rated clothing if outdoors for several hours; avoid use of tanning beds as these pose significant risk for melanoma and skin cancer.    LENTIGINES  OTHER SKIN CHANGES DUE TO CHRONIC EXPOSURE TO NONIONIZING RADIATION  - Relevant exam: Over sun exposed areas are brown macules. ELM performed and without concerning findings.  - Exam and clinical history consistent with lentigines.  - Educated that these are indicative of prior sun exposure.   - Counseled to return to clinic prior to scheduled appointment should any of these lesions change or should any new lesions of concern arise.  - Recommended use of sunscreen as above and below.  - Counseled on use of sun protection daily. Reviewed latest FDA sunscreen guidelines, including use of broad spectrum (UVA and UVB blocking) sunscreen or sun protective clothing with SPF 30-50 every 2-3 hours and reapplied after exposure to water; use of photoprotective clothing, including a broad brim hat and UPF rated clothing if outdoors for several hours; avoid  use of tanning beds as these pose significant risk for melanoma and skin cancer.    CHERRY ANGIOMAS  - Relevant exam: Scattered over the trunk/extremities are red papules  - Exam and clinical history consistent with cherry angiomas  - Educated that these are benign  - Educated that removal is considered aesthetic and would incur a fee.  - Patient does not wish to pursue removal at this time but will contact us should this change.    MILIUM     Physical Exam:  Anatomic Location Affected:  Left eyelid  Morphological Description:  white dome shaped papule  Pertinent Positives:  Pertinent Negatives:    Additional History of Present Condition:  Noted on exam.    Assessment and Plan:  Based on a thorough discussion of this condition and the management approach to it (including a comprehensive discussion of the known risks, side effects and potential benefits of treatment), the patient (family) agrees to implement the following specific plan:  Reassured benign, monitor for change    Assessment and Plan  A milium is a small cyst containing keratin (the skin protein); they are usually multiple and are then known as milia. These harmless cysts present as tiny pearly-white bumps just under the surface of the skin.  Milia are common in all ages and both sexes. They most often arise on the face and are particularly prominent on the eyelids and cheeks, but they may occur elsewhere.  There are various kinds of milia.  Primary milia in children and adults: found around eyelids, cheeks, forehead and genitalia, in young children, a row of milia may appear along the nasal crease, may clear in a few weeks or persist for months or longer.      Milia have a characteristic appearance. However, on occasion, a skin biopsy may be performed. This shows a small epidermoid cyst coming from a vellus hair follicle.  Milia should be distinguished from other types of cyst, comedones, xanthelasma and syringomas. Colloid milia are angelo coloured  bumps on cheeks and temples associated with excessive exposure to sunlight.  They should also be distinguished from milia-like cysts noted on dermoscopy in seborrhoeic keratoses, papillomatous moles and some basal cell carcinomas.  Milia do not need to be treated unless they are a cause for concern for the patient. They often clear up by themselves within a few months. Where possible, further trauma should be minimised to reduce the development of new lesions.  The lesion may be de-roofed using a sterile needle or blade and the contents squeezed or pricked out.  They may be destroyed using diathermy and curettage, or cryotherapy.  For widespread lesions, topical retinoids may be helpful.  Chemical peels, dermabrasion and laser ablation have been reported to be effective when used for very extensive milia.  Milia en plaque may improve with minocycline (a tetracycline antibiotic).    TINEA VERSICOLOR     Physical Exam:  Anatomic Location Affected:  Right axilla  Morphological Description:  tan macules with thin scale    Additional History of Present Condition:  Reported by patient. Rash present for 1 year. asymptomatic    Assessment and Plan:  - Most consistent with recurrent tinea versicolor  - Educated that tinea versicolor is a type of fungal infection on the skin due to an overgrowth of a yeast that lives on all of us that  thrives in warm, humid environments.   - Educated that this is not thought to represent an infectious condition, but multiple family members are often affected.   - Educated that after treatment, maintenance therapy is needed to prevent recurrence  - Educated that areas will likely remain hypopigmented for several months after treatment as it takes time for the skin to re-pigment. Emphasized that this does not indicate treatment failure.  - Based on a thorough discussion of this condition and the management approach to it (including a comprehensive discussion of the known risks, side effects  "and potential benefits of treatment), the patient (family) agrees to implement the following specific plan:    Ketoconazole 2% cream twice a day for 2-4 weeks, may need to repeat treatment when flared     NEOPLASM OF UNCERTAIN BEHAVIOR OF SKIN    Physical Exam:  (Anatomic Location); (Size and Morphological Description); (Differential Diagnosis):  A: Right medial thigh; 6 mm x 2 mm tan macule; ddx: lentigo rule out atypia  Pertinent Positives:  Pertinent Negatives:          Additional History of Present Condition:  Noted on exam.    Assessment and Plan:  I have discussed with the patient that a sample of skin via a \"skin biopsy” would be potentially helpful to further make a specific diagnosis under the microscope.  Based on a thorough discussion of this condition and the management approach to it (including a comprehensive discussion of the known risks, side effects and potential benefits of treatment), the patient (family) agrees to implement the following specific plan:    Procedure:  Skin Biopsy.  After a thorough discussion of treatment options and risk/benefits/alternatives (including but not limited to local pain, scarring, dyspigmentation, blistering, possible superinfection, and inability to confirm a diagnosis via histopathology), verbal and written consent were obtained and portion of the rash was biopsied for tissue sample.  See below for consent that was obtained from patient and subsequent Procedure Note.    PROCEDURE TANGENTIAL (SHAVE) BIOPSY NOTE:    Performing Physician:   Anatomic Location; Clinical Description with size (cm); Pre-Op Diagnosis:   A: Right medial thigh; 6 mm x 2 mm tan macule; ddx: lentigo rule out atypia  Post-op diagnosis: Same     Local anesthesia: 1% xylocaine with epi      Topical anesthesia: None    Hemostasis: Aluminum chloride       After obtaining informed consent  at which time there was a discussion about the purpose of biopsy  and low risks of infection and " "bleeding.  The area was prepped and draped in the usual fashion. Anesthesia was obtained with 1% lidocaine with epinephrine. A shave biopsy to an appropriate sampling depth was obtained by Shave (Dermablade or 15 blade) The resulting wound was covered with surgical ointment and bandaged appropriately.     The patient tolerated the procedure well without complications and was without signs of functional compromise.      Specimen has been sent for review by Dermatopathology.    Standard post-procedure care has been explained and has been included in written form within the patient's copy of Informed Consent.    INFORMED CONSENT DISCUSSION AND POST-OPERATIVE INSTRUCTIONS FOR PATIENT    I.  RATIONALE FOR PROCEDURE  I understand that a skin biopsy allows the Dermatologist to test a lesion or rash under the microscope to obtain a diagnosis.  It usually involves numbing the area with numbing medication and removing a small piece of skin; sometimes the area will be closed with sutures. In this specific procedure, sutures are not usually needed.  If any sutures are placed, then they are usually need to be removed in 2 weeks or less.    I understand that my Dermatologist recommends that a skin \"shave\" biopsy be performed today.  A local anesthetic, similar to the kind that a dentist uses when filling a cavity, will be injected with a very small needle into the skin area to be sampled.  The injected skin and tissue underneath \"will go to sleep” and become numb so no pain should be felt afterwards.  An instrument shaped like a tiny \"razor blade\" (shave biopsy instrument) will be used to cut a small piece of tissue and skin from the area so that a sample of tissue can be taken and examined more closely under the microscope.  A slight amount of bleeding will occur, but it will be stopped with direct pressure and a pressure bandage and any other appropriate methods.  I understands that a scar will form where the wound was created. " " Surgical ointment will be applied to help protect the wound.  Sutures are not usually needed.    II.  RISKS AND POTENTIAL COMPLICATIONS   I understand the risks and potential complications of a skin biopsy include but are not limited to the following:  Bleeding  Infection  Pain  Scar/keloid  Skin discoloration  Incomplete Removal  Recurrence  Nerve Damage/Numbness/Loss of Function  Allergic Reaction to Anesthesia  Biopsies are diagnostic procedures and based on findings additional treatment or evaluation may be required  Loss or destruction of specimen resulting in no additional findings    My Dermatologist has explained to me the nature of the condition, the nature of the procedure, and the benefits to be reasonably expected compared with alternative approaches.  My Dermatologist has discussed the likelihood of major risks or complications of this procedure including the specific risks listed above, such as bleeding, infection, and scarring/keloid.  I understand that a scar is expected after this procedure.  I understand that my physician cannot predict if the scar will form a \"keloid,\" which extends beyond the borders of the wound that is created.  A keloid is a thick, painful, and bumpy scar.  A keloid can be difficult to treat, as it does not always respond well to therapy, which includes injecting cortisone directly into the keloid every few weeks.  While this usually reduces the pain and size of the scar, it does not eliminate it.      I understand that photographs may be taken before and after the procedure.  These will be maintained as part of the medical providers confidential records and may not be made available to me.  I further authorize the medical provider to use the photographs for teaching purposes or to illustrate scientific papers, books, or lectures if in his/her judgment, medical research, education, or science may benefit from its use.    I have had an opportunity to fully inquire about the " "risks and benefits of this procedure and its alternatives.   I have been given ample time and opportunity to ask questions and to seek a second opinion if I wished to do so.  I acknowledge that there have specifically been no guarantees as to the cosmetic results from the procedure.  I am aware that with any procedure there is always the possibility of an unexpected complication.    III. POST-PROCEDURAL CARE (WHAT YOU WILL NEED TO DO \"AFTER THE BIOPSY\" TO OPTIMIZE HEALING)    Keep the area clean and dry.  Try NOT to remove the bandage or get it wet for the first 24 hours.    Gently clean the area and apply surgical ointment (such as Vaseline petrolatum ointment, which is available \"over the counter\" and not a prescription) to the biopsy site for up to 2 weeks straight.  This acts to protect the wound from the outside world.      Sutures are not usually placed in this procedure.  If any sutures were placed, return for suture removal as instructed (generally 1 week for the face, 2 weeks for the body).      Take Acetaminophen (Tylenol) for discomfort, if no contraindications.  Ibuprofen or aspirin could make bleeding worse.    Call our office immediately for signs of infection: fever, chills, increased redness, warmth, tenderness, discomfort/pain, or pus or foul smell coming from the wound.    WHAT TO DO IF THERE IS ANY BLEEDING?  If a small amount of bleeding is noticed, place a clean cloth over the area and apply firm pressure for ten minutes.  Check the wound after 10 minutes of direct pressure.  If bleeding persists, try one more time for an additional 10 minutes of direct pressure on the area.  If the bleeding becomes heavier or does not stop after the second attempt, or if you have any other questions about this procedure, then please call your [facility]'s Dermatologist by calling [phone] (SKIN).     I hereby acknowledge that I have reviewed and verified the site with my Dermatologist and have requested and " authorized my Dermatologist to proceed with the procedure.    NUMMULAR ECZEMA    Physical Exam:  Anatomic Location Affected:  Right calf  Morphological Description:  discrete pink scaley plaque   Pertinent Positives:  Pertinent Negatives:    Additional History of Present Condition:  Noted on exam.    Assessment and Plan:  Based on a thorough discussion of this condition and the management approach to it (including a comprehensive discussion of the known risks, side effects and potential benefits of treatment), the patient (family) agrees to implement the following specific plan:  Start Clobetasol 0.05% ointment: Apply topically twice a day for up to 14 days to active areas as needed. May repeat course after taking 1 week break. Do not use on face, groin, armpits.  Counseled to come back for reassessment if not resolved with clobetasol     Numular eczema   Nummular dermatitis (or eczema) is also known as discoid eczema (or dermatitis). It has two forms:  Exudative (‘wet’) nummular dermatitis   Dry nummular dermatitis    Exudative nummular dermatitis  The exudative variant starts acutely and may persist for weeks, months and rarely years. Although it may arise at any age, most subjects are over 50 years. In children, it is often thought to be a type of atopic dermatitis, but in adults it doesn't appear to relate to atopy. It is more common in males than females.  The initial plaque may appear at the site of trauma or infection. For example:  Thermal burn   Scabies infestation   Varicose vein surgery   Insect bite   Impetigo    Nummular dermatitis is sometimes due to drug allergy (e.g. to interferon alpha, intravenous immunoglobulins, etanercept) or systematised contact allergy, especially to nickel, gold and mercury.  The initial lesions are papules or vesicles, which form confluent plaques. The plaques may be crusted, weeping or blistered and are intensely itchy. Secondary infection with pustules, pain and spreading  erythema is not uncommon. Older lesions may be dry and excoriated.  Clusters of round or oval plaques may be localised to lower legs, the backs of the hands or other sites. Nummular dermatitis may also generalise to affect scalp, face, trunk and limbs. Generally the eruption is scattered, but sometimes the plaques are distributed symmetrically. In-between skin appears normal.    Investigations  Skin swabs frequently culture abundant Staphylococcus aureus from exudative nummular dermatitis and sometimes from dry discoid dermatitis.  Skin scrapings for microscopy and fungal culture may be necessary to rule out tinea corporis.    Differential diagnosis  Discoid eczema is frequently confused with the following skin disorders:  Psoriasis (redder, scalier plaques, symmetrical distribution, typical psoriatic sites)   Tinea corporis (grouped lesions, scaly or pustular edge)   Contact dermatitis (irregular shaped and sized lesions, contact factors)   Lichen simplex (lichenified plaques, may co-exist with nummular dermatitis)   Stasis dermatitis (lower legs, circumferential, hyperpigmentation, lipodermatosclerosis)    Management  Management of nummular dermatitis may involve the following:    Skin emollients  Emollients include bath oils, soap substitutes and moisturizing creams. They can be applied to the dermatitis as frequently as required to relieve itching, scaling and dryness. Emollients should also be used on the unaffected skin to reduce dryness. It may be necessary to try several different products to find one that suits. Many people find one or more of the following helpful: glycerine and cetomacrogol cream, white soft paraffin/liquid paraffin mixed, fatty cream, wool fat lotions.  Topical steroids   Topical steroids are anti-inflammatory creams or ointments available on prescription which may clear the dermatitis and reduce irritation. The stronger products are applied to the patches just once or twice daily for  2-4 weeks. They may be repeated from time to time depending on flare ups. Mild ones such as hydrocortisone are safe for daily use if necessary.  Antibiotics   Antibiotics (most often flucloxacillin) are often prescribed if the rash is blistered, sticky or crusted. Sometimes nummular eczema clears completely on oral antibiotics, only to recur when they are discontinued  Oral antihistamines   Antihistamine pills may reduce the itching, and are particularly helpful at night-time. They do not clear the dermatitis. Non-sedating antihistamines appear less useful for nummular eczema than first-generation antihistamines taken at night to help sleep.  Ultraviolet radiation (UV) treatment   Phototherapy several times weekly for 6-12 weeks can reduce the extent and severity of discoid eczema.  Steroid injections  Intralesional steroids are sometimes injected into one or two particularly stubborn areas of discoid eczema. This treatment is unsuitable for multiple lesions.  Oral steroids (very rarely)  Systemic steroids are reserved for severe and extensive cases of discoid eczema. They are usually prescribed for a few weeks while continuing steroid creams and emollients on residual dermatitis.  Other oral treatments   Persistent and troublesome nummular eczema is occasionally treated with methotrexate, azathioprine or ciclosporin. These medicines have important risks and side effects and require careful monitoring by a specialist dermatologist. They may be more suitable in many cases than long-term steroids.    Nummular eczema can usually be controlled with the above measures, although it has a tendency to recur when the treatment has been stopped. In most patients, nummular eczema eventually clears up completely.      Estefany English MD- Dermatology PGY2    Scribe Attestation      I,:  Miladis Trent MA am acting as a scribe while in the presence of the attending physician.:       I,:  Julio C Zayas MD personally  performed the services described in this documentation    as scribed in my presence.:

## 2024-08-27 PROCEDURE — 88305 TISSUE EXAM BY PATHOLOGIST: CPT | Performed by: PATHOLOGY

## 2024-08-28 NOTE — RESULT ENCOUNTER NOTE
DERMATOPATHOLOGY RESULT NOTE    Results reviewed by ordering physician.  Called patient to personally discuss results. Discussed results with patient.       Instructions for Clinical Derm Team:   (remember to route Result Note to appropriate staff):    None    Result & Plan by Specimen:    Specimen A: benign  Plan: monitor and reassured, benign      Tissue Exam: [id number]  Order: [id number]   Status: Final result      Visible to patient: Yes (seen)      Dx: Neoplasm of uncertain behavior of skin    0 Result Notes     Component   Case Report  Surgical Pathology Report                         Case: [id number]                                  Authorizing Provider:  Estefany English MD              Collected:           08/20/2024 1019              Ordering Location:     [facility]      Received:            08/20/2024 1019                                     [place]                                                                      Pathologist:           Gurpreet Abel MD                                                      Specimen:    Skin, Other, A: Right medial thigh                                                      Final Diagnosis  A. Skin, right medial thigh:  LENTIGO  Electronically signed by Gurpreet Abel MD on 8/27/2024 at  1:41 PM  Note   Interpretation performed at [facility] [address] [place] [id number]    Additional Information   All reported additional testing was performed with appropriately reactive controls.  These tests were developed and their performance characteristics determined by [facility] or appropriate performing facility, though some tests may be performed on tissues which have not been validated for performance characteristics (such as staining performed on alcohol exposed cell blocks and decalcified tissues).  Results should be interpreted with caution and in the context of the patients' clinical condition. These tests may not be  "cleared or approved by the U.S. Food and Drug Administration, though the FDA has determined that such clearance or approval is not necessary. These tests are used for clinical purposes and they should not be regarded as investigational or for research. This laboratory has been approved by CLIA 88, designated as a high-complexity laboratory and is qualified to perform these tests.  .  Gross Description   A. The specimen is received in formalin, labeled with the patient's name and hospital number, and is designated \" skin right medial thigh\".  It consists of a 0.6 x 0.5 x 0.1 cm skin shave.  The epidermis displays an ill-defined 0.5 x 0.3 cm tan-brown papule at the skin margin.  The resection margin is inked green and the specimen is bisected.  Entirely submitted between sponges in cassette A1.    Note: The estimated total formalin fixation time based upon information provided by the submitting clinician and the standard processing schedule is under 72 hours.  [facility]  Clinical Information   ATTENTION:  DERMPATH GROUP    SPECIMEN A; Skin; Anatomic Location: right medial thigh; Procedure/Protocol: Skin Specimen (submit in FORMALIN):Tangential Biopsy (includes shave, scoop, saucerization, curette) (CPT 38699; each additional tangential biopsy is CPT 29615)  53 y.o. year old  Female with a Morphological Description: 6 mm x 2 mm tan macule  Differential Diagnosis and/or Specific Clinical Question: ddx: lentigo rule out atypia      "

## 2024-09-26 ENCOUNTER — RA CDI HCC (OUTPATIENT)
Dept: OTHER | Facility: HOSPITAL | Age: 54
End: 2024-09-26

## 2024-09-26 NOTE — PROGRESS NOTES
HCC coding opportunities          Chart Reviewed number of suggestions sent to Provider: 1     Patients Insurance        Commercial Insurance: [employer]       Per CMS/ICD 10 coding guidelines, to be used when BMI >=40, with BMI code    E66.01: Morbid (severe) obesity due to excess calories (HCC) [[id number]]

## 2024-10-01 ENCOUNTER — TELEPHONE (OUTPATIENT)
Dept: FAMILY MEDICINE CLINIC | Facility: CLINIC | Age: 54
End: 2024-10-01

## 2024-10-01 NOTE — TELEPHONE ENCOUNTER
Patient walked in asking for a return to work note with no restrictions. Patient stated that she was seeing a podiatrist for foot and ankle swelling and was taken out of work for a few days patient had an appointment today but the provider had to leave the country for a family emergency and asked if patient can have the return to work note done by PCP

## 2024-10-01 NOTE — TELEPHONE ENCOUNTER
Called patient let her know that letter was created and ready for . Patient verbalized understanding and will pass by office to  letter.

## 2024-10-02 RX ORDER — MELOXICAM 15 MG/1
15 TABLET ORAL DAILY PRN
COMMUNITY
Start: 2024-08-20

## 2024-10-03 ENCOUNTER — OFFICE VISIT (OUTPATIENT)
Dept: FAMILY MEDICINE CLINIC | Facility: CLINIC | Age: 54
End: 2024-10-03
Payer: COMMERCIAL

## 2024-10-03 VITALS
OXYGEN SATURATION: 99 % | SYSTOLIC BLOOD PRESSURE: 122 MMHG | HEART RATE: 72 BPM | BODY MASS INDEX: 38.8 KG/M2 | WEIGHT: 219 LBS | TEMPERATURE: 97 F | DIASTOLIC BLOOD PRESSURE: 79 MMHG | HEIGHT: 63 IN

## 2024-10-03 DIAGNOSIS — G89.29 CHRONIC BILATERAL THORACIC BACK PAIN: ICD-10-CM

## 2024-10-03 DIAGNOSIS — H61.23 BILATERAL IMPACTED CERUMEN: ICD-10-CM

## 2024-10-03 DIAGNOSIS — Z00.00 ANNUAL PHYSICAL EXAM: Primary | ICD-10-CM

## 2024-10-03 DIAGNOSIS — M54.2 NECK PAIN: ICD-10-CM

## 2024-10-03 DIAGNOSIS — Z71.3 ENCOUNTER FOR WEIGHT LOSS COUNSELING: ICD-10-CM

## 2024-10-03 DIAGNOSIS — M54.6 CHRONIC BILATERAL THORACIC BACK PAIN: ICD-10-CM

## 2024-10-03 DIAGNOSIS — Z13.6 SCREENING FOR CARDIOVASCULAR CONDITION: ICD-10-CM

## 2024-10-03 PROCEDURE — 99215 OFFICE O/P EST HI 40 MIN: CPT | Performed by: FAMILY MEDICINE

## 2024-10-03 PROCEDURE — 69209 REMOVE IMPACTED EAR WAX UNI: CPT | Performed by: FAMILY MEDICINE

## 2024-10-03 PROCEDURE — 99396 PREV VISIT EST AGE 40-64: CPT | Performed by: FAMILY MEDICINE

## 2024-10-03 NOTE — ASSESSMENT & PLAN NOTE
Orders:    Ambulatory Referral to Nutrition Services; Future    TSH, 3rd generation with Free T4 reflex; Future    semaglutide, 0.25 or 0.5 mg/dose, (Ozempic, 0.25 or 0.5 MG/DOSE,) 2 mg/3 mL injection pen; 0.25 mg under the skin every 7 days for 4 doses (28 days), THEN 0.5 mg under the skin every 7 days

## 2024-10-03 NOTE — PROGRESS NOTES
Adult Annual Physical  Name: Jeni Yoon      : 1970      MRN: [id number]  Encounter Provider: Joseph Anne DO  Encounter Date: 10/3/2024   Encounter department: [facility]    Assessment & Plan  Annual physical exam         BMI 38.0-38.9,adult  Start GLP1  Orders:    Ambulatory Referral to Nutrition Services; Future    semaglutide, 0.25 or 0.5 mg/dose, (Ozempic, 0.25 or 0.5 MG/DOSE,) 2 mg/3 mL injection pen; 0.25 mg under the skin every 7 days for 4 doses (28 days), THEN 0.5 mg under the skin every 7 days    Encounter for weight loss counseling    Orders:    Ambulatory Referral to Nutrition Services; Future    TSH, 3rd generation with Free T4 reflex; Future    semaglutide, 0.25 or 0.5 mg/dose, (Ozempic, 0.25 or 0.5 MG/DOSE,) 2 mg/3 mL injection pen; 0.25 mg under the skin every 7 days for 4 doses (28 days), THEN 0.5 mg under the skin every 7 days    Neck pain  Chronic >5 yrs, related to large breasts  Attempting weight loss now.       Chronic bilateral thoracic back pain  Related to large breasts       Screening for cardiovascular condition    Orders:    Lipid Panel with Direct LDL reflex; Future    Hemoglobin A1C; Future    Comprehensive metabolic panel; Future    TSH, 3rd generation with Free T4 reflex; Future    Bilateral impacted cerumen  Irrigated today, mostly successful, very impacted from Q tip usage.           Immunizations and preventive care screenings were discussed with patient today. Appropriate education was printed on patient's after visit summary.    Counseling:  Alcohol/drug use: discussed moderation in alcohol intake, the recommendations for healthy alcohol use, and avoidance of illicit drug use.  Dental Health: discussed importance of regular tooth brushing, flossing, and dental visits.  Injury prevention: discussed safety/seat belts, safety helmets, smoke detectors, carbon monoxide detectors, and smoking near bedding or upholstery.  Sexual health: discussed sexually  transmitted diseases, partner selection, use of condoms, avoidance of unintended pregnancy, and contraceptive alternatives.  Exercise: the importance of regular exercise/physical activity was discussed. Recommend exercise 3-5 times per week for at least 30 minutes.          History of Present Illness     Adult Annual Physical:  Patient presents for annual physical. Large breasts discussion, would like reduction at some point.    Cerumen impaction    Weight loss discussion.     Diet and Physical Activity:  - Diet/Nutrition: well balanced diet.  - Exercise: no formal exercise.    General Health:  - Sleep: sleeps well.  - Hearing: normal hearing bilateral ears.  - Vision: no vision problems, most recent eye exam > 1 year ago and wears contacts.  - Dental: regular dental visits and brushes teeth twice daily.    /GYN Health:  - Follows with GYN: yes.   - Menopause: postmenopausal.   - History of STDs: no    Advanced Care Planning:  - Has an advanced directive?: no    - Has a durable medical POA?: no    - ACP document given to patient?: no      Review of Systems   Constitutional:  Negative for chills and fever.   HENT:  Negative for ear pain and sore throat.    Eyes:  Negative for pain and visual disturbance.   Respiratory:  Negative for cough and shortness of breath.    Cardiovascular:  Negative for chest pain and palpitations.   Gastrointestinal:  Negative for abdominal pain and vomiting.   Genitourinary:  Negative for dysuria and hematuria.   Musculoskeletal:  Negative for arthralgias and back pain.   Skin:  Negative for color change and rash.   Neurological:  Negative for seizures and syncope.   All other systems reviewed and are negative.    Medical History Reviewed by provider this encounter:  Tobacco  Allergies  Meds  Problems  Med Hx  Surg Hx  Fam Hx       Current Outpatient Medications on File Prior to Visit   Medication Sig Dispense Refill    aspirin 81 MG tablet Take 81 mg by mouth if needed (Patient  "not taking: Reported on 8/20/2024)      clobetasol (TEMOVATE) 0.05 % ointment Apply topically 2 (two) times a day (Patient not taking: Reported on 10/3/2024) 15 g 1    hydrocortisone 2.5 % ointment Apply topically 2 (two) times a day (Patient not taking: Reported on 8/20/2024) 20 g 3    ketoconazole (NIZORAL) 2 % cream Apply topically daily (Patient not taking: Reported on 10/3/2024) 60 g 1    meloxicam (MOBIC) 15 mg tablet Take 15 mg by mouth daily as needed (Patient not taking: Reported on 10/3/2024)      omeprazole (PriLOSEC) 20 mg delayed release capsule Take 20 mg by mouth daily (Patient not taking: Reported on 8/20/2024)       No current facility-administered medications on file prior to visit.      Social History     Tobacco Use    Smoking status: Never    Smokeless tobacco: Never   Vaping Use    Vaping status: Never Used   Substance and Sexual Activity    Alcohol use: Yes    Drug use: No    Sexual activity: Yes       Objective     /79 (BP Location: Left arm, Patient Position: Sitting, Cuff Size: Large)   Pulse 72   Temp (!) 97 °F (36.1 °C) (Temporal)   Ht 5' 3\" (1.6 m)   Wt 99.3 kg (219 lb)   LMP 05/21/2019   SpO2 99%   BMI 38.79 kg/m²     Physical Exam  Vitals reviewed.   Constitutional:       General: She is not in acute distress.     Appearance: She is well-developed.   HENT:      Head: Normocephalic and atraumatic.      Right Ear: There is impacted cerumen.      Left Ear: There is impacted cerumen.   Eyes:      Conjunctiva/sclera: Conjunctivae normal.   Cardiovascular:      Rate and Rhythm: Normal rate and regular rhythm.      Pulses: Normal pulses.      Heart sounds: Normal heart sounds. No murmur heard.  Pulmonary:      Effort: Pulmonary effort is normal. No respiratory distress.      Breath sounds: Normal breath sounds.   Abdominal:      Palpations: Abdomen is soft.      Tenderness: There is no abdominal tenderness.   Musculoskeletal:         General: No swelling.      Cervical back: Neck " supple.   Skin:     General: Skin is warm and dry.      Capillary Refill: Capillary refill takes less than 2 seconds.   Neurological:      Mental Status: She is alert and oriented to person, place, and time.   Psychiatric:         Mood and Affect: Mood normal.         Administrative Statements   I have spent a total time of 73 minutes in caring for this patient on the day of the visit/encounter including Diagnostic results, Prognosis, Risks and benefits of tx options, Instructions for management, Impressions, Counseling / Coordination of care, Documenting in the medical record, Reviewing / ordering tests, medicine, procedures  , and Obtaining or reviewing history  .    Ear cerumen removal    Date/Time: 10/3/2024 8:20 AM    Performed by: Joseph Anne DO  Authorized by: Joseph Anne DO  Universal Protocol:  Consent: Verbal consent obtained.  Risks and benefits: risks, benefits and alternatives were discussed  Consent given by: patient    Procedure details:     Location:  Both ears    Procedure type: irrigation only      Approach:  External  Post-procedure details:     Complication:  None    Hearing quality:  Improved    Patient tolerance of procedure:  Tolerated well, no immediate complications

## 2024-10-03 NOTE — ASSESSMENT & PLAN NOTE
Orders:    Lipid Panel with Direct LDL reflex; Future    Hemoglobin A1C; Future    Comprehensive metabolic panel; Future    TSH, 3rd generation with Free T4 reflex; Future

## 2024-10-03 NOTE — PATIENT INSTRUCTIONS
"Patient Education     Routine physical for adults   The Basics   Written by the doctors and editors at [facility]   What is a physical? -- A physical is a routine visit, or \"check-up,\" with your doctor. You might also hear it called a \"wellness visit\" or \"preventive visit.\"  During each visit, the doctor will:   Ask about your physical and mental health   Ask about your habits, behaviors, and lifestyle   Do an exam   Give you vaccines if needed   Talk to you about any medicines you take   Give advice about your health   Answer your questions  Getting regular check-ups is an important part of taking care of your health. It can help your doctor find and treat any problems you have. But it's also important for preventing health problems.  A routine physical is different from a \"sick visit.\" A sick visit is when you see a doctor because of a health concern or problem. Since physicals are scheduled ahead of time, you can think about what you want to ask the doctor.  How often should I get a physical? -- It depends on your age and health. In general, for people age 21 years and older:   If you are younger than 50 years, you might be able to get a physical every 3 years.   If you are 50 years or older, your doctor might recommend a physical every year.  If you have an ongoing health condition, like diabetes or high blood pressure, your doctor will probably want to see you more often.  What happens during a physical? -- In general, each visit will include:   Physical exam - The doctor or nurse will check your height, weight, heart rate, and blood pressure. They will also look at your eyes and ears. They will ask about how you are feeling and whether you have any symptoms that bother you.   Medicines - It's a good idea to bring a list of all the medicines you take to each doctor visit. Your doctor will talk to you about your medicines and answer any questions. Tell them if you are having any side effects that bother you. You " "should also tell them if you are having trouble paying for any of your medicines.   Habits and behaviors - This includes:   Your diet   Your exercise habits   Whether you smoke, drink alcohol, or use drugs   Whether you are sexually active   Whether you feel safe at home  Your doctor will talk to you about things you can do to improve your health and lower your risk of health problems. They will also offer help and support. For example, if you want to quit smoking, they can give you advice and might prescribe medicines. If you want to improve your diet or get more physical activity, they can help you with this, too.   Lab tests, if needed - The tests you get will depend on your age and situation. For example, your doctor might want to check your:   Cholesterol   Blood sugar   Iron level   Vaccines - The recommended vaccines will depend on your age, health, and what vaccines you already had. Vaccines are very important because they can prevent certain serious or deadly infections.   Discussion of screening - \"Screening\" means checking for diseases or other health problems before they cause symptoms. Your doctor can recommend screening based on your age, risk, and preferences. This might include tests to check for:   Cancer, such as breast, prostate, cervical, ovarian, colorectal, prostate, lung, or skin cancer   Sexually transmitted infections, such as chlamydia and gonorrhea   Mental health conditions like depression and anxiety  Your doctor will talk to you about the different types of screening tests. They can help you decide which screenings to have. They can also explain what the results might mean.   Answering questions - The physical is a good time to ask the doctor or nurse questions about your health. If needed, they can refer you to other doctors or specialists, too.  Adults older than 65 years often need other care, too. As you get older, your doctor will talk to you about:   How to prevent falling at " home   Hearing or vision tests   Memory testing   How to take your medicines safely   Making sure that you have the help and support you need at home  All topics are updated as new evidence becomes available and our peer review process is complete.  This topic retrieved from [employer] on: May 02, 2024.  Topic 654419 Version 1.0  Release: 32.4.3 - C32.122  © 2024 UpToDate, Inc. and/or its affiliates. All rights reserved.  Consumer Information Use and Disclaimer   Disclaimer: This generalized information is a limited summary of diagnosis, treatment, and/or medication information. It is not meant to be comprehensive and should be used as a tool to help the user understand and/or assess potential diagnostic and treatment options. It does NOT include all information about conditions, treatments, medications, side effects, or risks that may apply to a specific patient. It is not intended to be medical advice or a substitute for the medical advice, diagnosis, or treatment of a health care provider based on the health care provider's examination and assessment of a patient's specific and unique circumstances. Patients must speak with a health care provider for complete information about their health, medical questions, and treatment options, including any risks or benefits regarding use of medications. This information does not endorse any treatments or medications as safe, effective, or approved for treating a specific patient. UpToDate, Inc. and its affiliates disclaim any warranty or liability relating to this information or the use thereof.The use of this information is governed by the Terms of Use, available at https://www.[employer] Mobileuwer.com/en/know/clinical-effectiveness-terms. 2024© UpToDate, Inc. and its affiliates and/or licensors. All rights reserved.  Copyright   © 2024 UpToDate, Inc. and/or its affiliates. All rights reserved.

## 2024-10-03 NOTE — ASSESSMENT & PLAN NOTE
Start GLP1  Orders:    Ambulatory Referral to Nutrition Services; Future    semaglutide, 0.25 or 0.5 mg/dose, (Ozempic, 0.25 or 0.5 MG/DOSE,) 2 mg/3 mL injection pen; 0.25 mg under the skin every 7 days for 4 doses (28 days), THEN 0.5 mg under the skin every 7 days

## 2024-10-05 ENCOUNTER — APPOINTMENT (OUTPATIENT)
Dept: LAB | Facility: HOSPITAL | Age: 54
End: 2024-10-05
Payer: COMMERCIAL

## 2024-10-05 DIAGNOSIS — Z13.6 SCREENING FOR CARDIOVASCULAR CONDITION: ICD-10-CM

## 2024-10-05 DIAGNOSIS — Z71.3 ENCOUNTER FOR WEIGHT LOSS COUNSELING: ICD-10-CM

## 2024-10-05 LAB
ALBUMIN SERPL BCG-MCNC: 4 G/DL (ref 3.5–5)
ALP SERPL-CCNC: 68 U/L (ref 34–104)
ALT SERPL W P-5'-P-CCNC: 24 U/L (ref 7–52)
ANION GAP SERPL CALCULATED.3IONS-SCNC: 6 MMOL/L (ref 4–13)
AST SERPL W P-5'-P-CCNC: 17 U/L (ref 13–39)
BILIRUB SERPL-MCNC: 0.54 MG/DL (ref 0.2–1)
BUN SERPL-MCNC: 16 MG/DL (ref 5–25)
CALCIUM SERPL-MCNC: 9.5 MG/DL (ref 8.4–10.2)
CHLORIDE SERPL-SCNC: 107 MMOL/L (ref 96–108)
CHOLEST SERPL-MCNC: 219 MG/DL
CO2 SERPL-SCNC: 30 MMOL/L (ref 21–32)
CREAT SERPL-MCNC: 0.89 MG/DL (ref 0.6–1.3)
EST. AVERAGE GLUCOSE BLD GHB EST-MCNC: 114 MG/DL
GFR SERPL CREATININE-BSD FRML MDRD: 73 ML/MIN/1.73SQ M
GLUCOSE P FAST SERPL-MCNC: 101 MG/DL (ref 65–99)
HBA1C MFR BLD: 5.6 %
HDLC SERPL-MCNC: 57 MG/DL
LDLC SERPL CALC-MCNC: 134 MG/DL (ref 0–100)
POTASSIUM SERPL-SCNC: 4.2 MMOL/L (ref 3.5–5.3)
PROT SERPL-MCNC: 7.3 G/DL (ref 6.4–8.4)
SODIUM SERPL-SCNC: 143 MMOL/L (ref 135–147)
TRIGL SERPL-MCNC: 138 MG/DL
TSH SERPL DL<=0.05 MIU/L-ACNC: 2.8 UIU/ML (ref 0.45–4.5)

## 2024-10-05 PROCEDURE — 80053 COMPREHEN METABOLIC PANEL: CPT

## 2024-10-05 PROCEDURE — 84443 ASSAY THYROID STIM HORMONE: CPT

## 2024-10-05 PROCEDURE — 36415 COLL VENOUS BLD VENIPUNCTURE: CPT

## 2024-10-05 PROCEDURE — 80061 LIPID PANEL: CPT

## 2024-10-05 PROCEDURE — 83036 HEMOGLOBIN GLYCOSYLATED A1C: CPT

## 2024-10-07 ENCOUNTER — TELEPHONE (OUTPATIENT)
Age: 54
End: 2024-10-07

## 2024-10-07 ENCOUNTER — TELEPHONE (OUTPATIENT)
Dept: FAMILY MEDICINE CLINIC | Facility: CLINIC | Age: 54
End: 2024-10-07

## 2024-10-07 DIAGNOSIS — Z71.3 ENCOUNTER FOR WEIGHT LOSS COUNSELING: ICD-10-CM

## 2024-10-07 RX ORDER — SEMAGLUTIDE 0.25 MG/.5ML
INJECTION, SOLUTION SUBCUTANEOUS
Qty: 2 ML | Refills: 0 | Status: SHIPPED | OUTPATIENT
Start: 2024-10-07

## 2024-10-07 NOTE — TELEPHONE ENCOUNTER
Ozempic, 0.25 or 0.5 MG/DOSE will not be covered with or without a prior authorization, Ozempic, 0.25 or 0.5 MG/DOSE is not FDA approved for obesity, prediabetes, etc. Ozempic, 0.25 or 0.5 MG/DOSE is only FDA Approved for Type 2 Diabetes and will only be Approved via a Prior Authorization if patient has a diagnosis of Type 2 Diabetes.

## 2024-10-07 NOTE — TELEPHONE ENCOUNTER
Left message to call back. RE: Wegovy was denied - Another option is Hugh, 10 week supply is about 300 dollars if she's okay with that.

## 2024-10-07 NOTE — TELEPHONE ENCOUNTER
PA for Wegovy 0.25 MG/0.5ML DENIED    Reason:(Screenshot if applicable)    Plan exclusion.          Message sent to office clinical pool Yes    Denial letter scanned into Media Yes    Appeal started No (Provider will need to decide if appeal is warranted and send clinical documentation to Prior Authorization Team for initiation.)    **Please follow up with your patient regarding denial and next steps**

## 2024-10-07 NOTE — TELEPHONE ENCOUNTER
PA for Semaglutide-Weight Management (Wegovy) 0.25 MG/0.5ML SUBMITTED     via    []CMM-KEY:   []Surescripts-Case ID #   []Availity-Auth ID # NDC #   []Faxed to plan   [x]Other website [employer] Auth#[id number]  []Phone call Case ID #     Office notes sent, clinical questions answered. Awaiting determination    Turnaround time for your insurance to make a decision on your Prior Authorization can take 7-21 business days.

## 2024-10-11 NOTE — TELEPHONE ENCOUNTER
Patient called back and is agreeable [facility]

## 2024-11-02 PROBLEM — H61.23 BILATERAL IMPACTED CERUMEN: Status: RESOLVED | Noted: 2024-10-03 | Resolved: 2024-11-02

## 2024-11-02 PROBLEM — Z13.6 SCREENING FOR CARDIOVASCULAR CONDITION: Status: RESOLVED | Noted: 2023-12-12 | Resolved: 2024-11-02

## 2025-02-06 ENCOUNTER — TELEPHONE (OUTPATIENT)
Dept: FAMILY MEDICINE CLINIC | Facility: CLINIC | Age: 55
End: 2025-02-06

## 2025-02-06 DIAGNOSIS — Z13.6 SCREENING FOR CARDIOVASCULAR CONDITION: Primary | ICD-10-CM

## 2025-02-06 NOTE — TELEPHONE ENCOUNTER
Patient called stated that she is moving out of [place] and will be losing her insurance next month so she will like to know if she can have blood work done prior to losing her insurance.

## 2025-03-29 ENCOUNTER — APPOINTMENT (OUTPATIENT)
Dept: LAB | Facility: HOSPITAL | Age: 55
End: 2025-03-29
Payer: COMMERCIAL

## 2025-03-29 DIAGNOSIS — Z13.6 SCREENING FOR CARDIOVASCULAR CONDITION: ICD-10-CM

## 2025-03-29 LAB
25(OH)D3 SERPL-MCNC: 29.8 NG/ML (ref 30–100)
ALBUMIN SERPL BCG-MCNC: 4 G/DL (ref 3.5–5)
ALP SERPL-CCNC: 73 U/L (ref 34–104)
ALT SERPL W P-5'-P-CCNC: 21 U/L (ref 7–52)
ANION GAP SERPL CALCULATED.3IONS-SCNC: 6 MMOL/L (ref 4–13)
AST SERPL W P-5'-P-CCNC: 18 U/L (ref 13–39)
BASOPHILS # BLD AUTO: 0.04 THOUSANDS/ÂΜL (ref 0–0.1)
BASOPHILS NFR BLD AUTO: 1 % (ref 0–1)
BILIRUB SERPL-MCNC: 0.6 MG/DL (ref 0.2–1)
BUN SERPL-MCNC: 15 MG/DL (ref 5–25)
CALCIUM SERPL-MCNC: 9.2 MG/DL (ref 8.4–10.2)
CHLORIDE SERPL-SCNC: 110 MMOL/L (ref 96–108)
CHOLEST SERPL-MCNC: 203 MG/DL (ref ?–200)
CO2 SERPL-SCNC: 26 MMOL/L (ref 21–32)
CREAT SERPL-MCNC: 0.78 MG/DL (ref 0.6–1.3)
EOSINOPHIL # BLD AUTO: 0 THOUSAND/ÂΜL (ref 0–0.61)
EOSINOPHIL NFR BLD AUTO: 0 % (ref 0–6)
ERYTHROCYTE [DISTWIDTH] IN BLOOD BY AUTOMATED COUNT: 12.5 % (ref 11.6–15.1)
EST. AVERAGE GLUCOSE BLD GHB EST-MCNC: 120 MG/DL
GFR SERPL CREATININE-BSD FRML MDRD: 85 ML/MIN/1.73SQ M
GLUCOSE P FAST SERPL-MCNC: 100 MG/DL (ref 65–99)
HBA1C MFR BLD: 5.8 %
HCT VFR BLD AUTO: 45.2 % (ref 34.8–46.1)
HDLC SERPL-MCNC: 55 MG/DL
HGB BLD-MCNC: 15.1 G/DL (ref 11.5–15.4)
IMM GRANULOCYTES # BLD AUTO: 0.01 THOUSAND/UL (ref 0–0.2)
IMM GRANULOCYTES NFR BLD AUTO: 0 % (ref 0–2)
LDLC SERPL CALC-MCNC: 111 MG/DL (ref 0–100)
LYMPHOCYTES # BLD AUTO: 1.6 THOUSANDS/ÂΜL (ref 0.6–4.47)
LYMPHOCYTES NFR BLD AUTO: 28 % (ref 14–44)
MCH RBC QN AUTO: 31.4 PG (ref 26.8–34.3)
MCHC RBC AUTO-ENTMCNC: 33.4 G/DL (ref 31.4–37.4)
MCV RBC AUTO: 94 FL (ref 82–98)
MONOCYTES # BLD AUTO: 0.3 THOUSAND/ÂΜL (ref 0.17–1.22)
MONOCYTES NFR BLD AUTO: 5 % (ref 4–12)
NEUTROPHILS # BLD AUTO: 3.79 THOUSANDS/ÂΜL (ref 1.85–7.62)
NEUTS SEG NFR BLD AUTO: 66 % (ref 43–75)
NRBC BLD AUTO-RTO: 0 /100 WBCS
PLATELET # BLD AUTO: 175 THOUSANDS/UL (ref 149–390)
PMV BLD AUTO: 10.4 FL (ref 8.9–12.7)
POTASSIUM SERPL-SCNC: 4 MMOL/L (ref 3.5–5.3)
PROT SERPL-MCNC: 7.2 G/DL (ref 6.4–8.4)
RBC # BLD AUTO: 4.81 MILLION/UL (ref 3.81–5.12)
SODIUM SERPL-SCNC: 142 MMOL/L (ref 135–147)
TRIGL SERPL-MCNC: 186 MG/DL (ref ?–150)
TSH SERPL DL<=0.05 MIU/L-ACNC: 1.69 UIU/ML (ref 0.45–4.5)
WBC # BLD AUTO: 5.74 THOUSAND/UL (ref 4.31–10.16)

## 2025-03-29 PROCEDURE — 80053 COMPREHEN METABOLIC PANEL: CPT

## 2025-03-29 PROCEDURE — 80061 LIPID PANEL: CPT

## 2025-03-29 PROCEDURE — 85025 COMPLETE CBC W/AUTO DIFF WBC: CPT

## 2025-03-29 PROCEDURE — 36415 COLL VENOUS BLD VENIPUNCTURE: CPT

## 2025-03-29 PROCEDURE — 82306 VITAMIN D 25 HYDROXY: CPT

## 2025-03-29 PROCEDURE — 83036 HEMOGLOBIN GLYCOSYLATED A1C: CPT

## 2025-03-29 PROCEDURE — 84443 ASSAY THYROID STIM HORMONE: CPT

## 2025-03-31 ENCOUNTER — RESULTS FOLLOW-UP (OUTPATIENT)
Dept: FAMILY MEDICINE CLINIC | Facility: CLINIC | Age: 55
End: 2025-03-31